# Patient Record
Sex: FEMALE | Race: BLACK OR AFRICAN AMERICAN | NOT HISPANIC OR LATINO | Employment: FULL TIME | URBAN - METROPOLITAN AREA
[De-identification: names, ages, dates, MRNs, and addresses within clinical notes are randomized per-mention and may not be internally consistent; named-entity substitution may affect disease eponyms.]

---

## 2017-05-23 ENCOUNTER — HOSPITAL ENCOUNTER (EMERGENCY)
Facility: CLINIC | Age: 27
Discharge: HOME | End: 2017-05-23
Attending: EMERGENCY MEDICINE | Admitting: EMERGENCY MEDICINE
Payer: COMMERCIAL

## 2017-05-23 VITALS
HEART RATE: 94 BPM | TEMPERATURE: 99.1 F | SYSTOLIC BLOOD PRESSURE: 121 MMHG | WEIGHT: 180.6 LBS | BODY MASS INDEX: 29.15 KG/M2 | RESPIRATION RATE: 18 BRPM | OXYGEN SATURATION: 99 % | DIASTOLIC BLOOD PRESSURE: 79 MMHG

## 2017-05-23 DIAGNOSIS — G89.29 CHRONIC LEFT-SIDED LOW BACK PAIN WITH LEFT-SIDED SCIATICA: ICD-10-CM

## 2017-05-23 DIAGNOSIS — M54.42 CHRONIC LEFT-SIDED LOW BACK PAIN WITH LEFT-SIDED SCIATICA: ICD-10-CM

## 2017-05-23 PROCEDURE — 99283 EMERGENCY DEPT VISIT LOW MDM: CPT

## 2017-05-23 PROCEDURE — 99283 EMERGENCY DEPT VISIT LOW MDM: CPT | Mod: Z6 | Performed by: EMERGENCY MEDICINE

## 2017-05-23 RX ORDER — NAPROXEN 500 MG/1
500 TABLET ORAL 2 TIMES DAILY WITH MEALS
Qty: 16 TABLET | Refills: 0 | Status: SHIPPED | OUTPATIENT
Start: 2017-05-23 | End: 2017-05-31

## 2017-05-23 RX ORDER — NAPROXEN 500 MG/1
500 TABLET ORAL ONCE
Status: DISCONTINUED | OUTPATIENT
Start: 2017-05-23 | End: 2017-05-23 | Stop reason: HOSPADM

## 2017-05-23 NOTE — ED AVS SNAPSHOT
[facility], [place], Emergency Department    [address]    [place] [address]    Phone:  [phone]    Fax:  [phone]                                       Bev Strong   MRN: [id number]    Department:  [facility], Emergency Department   Date of Visit:  5/23/2017           After Visit Summary Signature Page     I have received my discharge instructions, and my questions have been answered. I have discussed any challenges I see with this plan with the nurse or doctor.    ..........................................................................................................................................  Patient/Patient Representative Signature      ..........................................................................................................................................  Patient Representative Print Name and Relationship to Patient    ..................................................               ................................................  Date                                            Time    ..........................................................................................................................................  Reviewed by Signature/Title    ...................................................              ..............................................  Date                                                            Time

## 2017-05-23 NOTE — ED AVS SNAPSHOT
[facility], Emergency Department    [address]    [place] [address]    Phone:  [phone]    Fax:  [phone]                                       Bev Strong   MRN: [id number]    Department:  [facility], Emergency Department   Date of Visit:  5/23/2017           Patient Information     Date Of Birth          1990        Your diagnoses for this visit were:     Chronic left-sided low back pain with left-sided sciatica        You were seen by Carroll Page MD.        Discharge Instructions       Please make an appointment to follow up with Your Primary Care Provider as soon as possible if you have any concerns.      24 Hour Appointment Hotline       To make an appointment at any [facility] clinic, call [phone] ([phone]). If you don't have a family doctor or clinic, we will help you find one. [facility] clinics are conveniently located to serve the needs of you and your family.             Review of your medicines      START taking        Dose / Directions Last dose taken    naproxen 500 MG tablet   Commonly known as:  NAPROSYN   Dose:  500 mg   Quantity:  16 tablet        Take 1 tablet (500 mg) by mouth 2 times daily (with meals) for 8 days   Refills:  0          Our records show that you are taking the medicines listed below. If these are incorrect, please call your family doctor or clinic.        Dose / Directions Last dose taken    PRILOSEC PO   Dose:  20 mg        Take 20 mg by mouth   Refills:  0        TYLENOL PO   Dose:  500 mg        Take 500 mg by mouth   Refills:  0                Prescriptions were sent or printed at these locations (1 Prescription)                   Other Prescriptions                Printed at Department/Unit printer (1 of 1)         naproxen (NAPROSYN) 500 MG tablet                Orders Needing Specimen Collection     None      Pending Results     No orders found from 5/21/2017 to 5/24/2017.            Pending Culture Results     No  "orders found from 2017 to 2017.            Pending Results Instructions     If you had any lab results that were not finalized at the time of your Discharge, you can call the ED Lab Result RN at [phone]. You will be contacted by this team for any positive Lab results or changes in treatment. The nurses are available 7 days a week from 10A to 6:30P.  You can leave a message 24 hours per day and they will return your call.        Thank you for choosing [facility]       Thank you for choosing [facility] for your care. Our goal is always to provide you with excellent care. Hearing back from our patients is one way we can continue to improve our services. Please take a few minutes to complete the written survey that you may receive in the mail after you visit with us. Thank you!        [facility] Information     [facility] lets you send messages to your doctor, view your test results, renew your prescriptions, schedule appointments and more. To sign up, go to [email or web address] . Click on \"Log in\" on the left side of the screen, which will take you to the Welcome page. Then click on \"Sign up Now\" on the right side of the page.     You will be asked to enter the access code listed below, as well as some personal information. Please follow the directions to create your username and password.     Your access code is: [email or web address]  Expires: 2017  7:58 PM     Your access code will  in 90 days. If you need help or a new code, please call your [facility] clinic or [phone].        Care EveryWhere ID     This is your Care EveryWhere ID. This could be used by other organizations to access your [facility] medical records  [id number]        After Visit Summary       This is your record. Keep this with you and show to your community pharmacist(s) and doctor(s) at your next visit.                  "

## 2017-05-24 NOTE — DISCHARGE INSTRUCTIONS
Please make an appointment to follow up with Your Primary Care Provider as soon as possible if you have any concerns.

## 2017-05-24 NOTE — ED NOTES
Patient fell out of couch 1 week ago.  Come here today with increased back pain and tingling down left leg. Full weight bearing.

## 2017-05-24 NOTE — ED PROVIDER NOTES
History     Chief Complaint   Patient presents with     Back Pain     Last Thursday, fell asleep on couch and rolled off sofa onto the floor; has left back and thigh pain     Leg Pain     tingling down left buttock and down leg; shooting pain down leg also     HPI  Bev Strong is a 27 year old female who presents with complaint of left lower back pain and tingling with down her leg.  She admits to having similar symptoms in the past.  This particular incident was agitated when she rolled off her couch while she was sleeping last Thursday.  She admits to being able to urinate and defecate without issue.  She denies any other numbness.  She's been utilizing Tylenol at home with no significant improvement.  She has utilized steroids in the past for this complaint.    I have reviewed the Medications, Allergies, Past Medical and Surgical History, and Social History in the Epic system.    Review of Systems   All other systems reviewed and are negative.      Physical Exam   BP: 114/67  Pulse: 96  Heart Rate: 98  Temp: 98.6  F (37  C)  Resp: 16  Weight: 81.9 kg (180 lb 9.6 oz)  SpO2: 100 %  Physical Exam   Constitutional: She is oriented to person, place, and time. She appears well-developed and well-nourished. No distress.   HENT:   Head: Normocephalic and atraumatic.   Eyes: No scleral icterus.   Neck: Normal range of motion. Neck supple.   Cardiovascular: Normal rate.    Pulmonary/Chest: Effort normal.   Musculoskeletal:   Patient staying in bed in no obvious distress.  She is able to ambulate without issues.    Does have some minimal paraspinal tenderness on the left.   Neurological: She is alert and oriented to person, place, and time.   Skin: Skin is warm and dry. No rash noted. She is not diaphoretic. No erythema. No pallor.       ED Course     ED Course     Procedures             Critical Care time:  none               Labs Ordered and Resulted from Time of ED Arrival Up to the Time of Departure from the ED  - No data to display         Assessments & Plan (with Medical Decision Making)   This is a 27-year-old female patient presenting to the emergency room with complaints of left lower back pain with sciatica.  She is in no obvious distress no concerns for cauda equina.  Suspended issue for her in the past and she was recently seen in March for similar symptoms.  She has taken prednisone and topical creams for this in the past with no significant improvement.  She is ambulatory without issue and has no obvious signs of cauda equina.  She did receive a prescription for naproxen and can follow up with her primary care doctor for any further concerns.    I have reviewed the nursing notes.    I have reviewed the findings, diagnosis, plan and need for follow up with the patient.    Discharge Medication List as of 5/23/2017  7:58 PM      START taking these medications    Details   naproxen (NAPROSYN) 500 MG tablet Take 1 tablet (500 mg) by mouth 2 times daily (with meals) for 8 days, Disp-16 tablet, R-0, Local Print             Final diagnoses:   Chronic left-sided low back pain with left-sided sciatica       5/23/2017   [facility], [place], EMERGENCY DEPARTMENT     Carroll Page MD  05/23/17 4888

## 2017-06-13 ENCOUNTER — HOSPITAL ENCOUNTER (EMERGENCY)
Facility: CLINIC | Age: 27
Discharge: HOME | End: 2017-06-13
Attending: EMERGENCY MEDICINE

## 2017-06-13 VITALS
DIASTOLIC BLOOD PRESSURE: 78 MMHG | HEART RATE: 92 BPM | WEIGHT: 187.13 LBS | TEMPERATURE: 99.3 F | RESPIRATION RATE: 16 BRPM | SYSTOLIC BLOOD PRESSURE: 115 MMHG | OXYGEN SATURATION: 96 %

## 2017-06-13 DIAGNOSIS — M54.42 ACUTE LEFT-SIDED LOW BACK PAIN WITH LEFT-SIDED SCIATICA: ICD-10-CM

## 2017-06-13 RX ORDER — KETOROLAC TROMETHAMINE 30 MG/ML
30 INJECTION, SOLUTION INTRAMUSCULAR; INTRAVENOUS ONCE
Status: DISCONTINUED | OUTPATIENT
Start: 2017-06-13 | End: 2017-06-14 | Stop reason: HOSPADM

## 2017-06-13 RX ORDER — CYCLOBENZAPRINE HCL 5 MG
5 TABLET ORAL 3 TIMES DAILY PRN
Qty: 20 TABLET | Refills: 0 | Status: SHIPPED | OUTPATIENT
Start: 2017-06-13 | End: 2020-12-23

## 2017-06-13 RX ORDER — NAPROXEN 500 MG/1
500 TABLET ORAL 2 TIMES DAILY WITH MEALS
Qty: 16 TABLET | Refills: 0 | Status: SHIPPED | OUTPATIENT
Start: 2017-06-13 | End: 2017-06-21

## 2017-06-13 RX ORDER — CYCLOBENZAPRINE HCL 5 MG
5 TABLET ORAL 2 TIMES DAILY PRN
Qty: 20 TABLET | Refills: 0 | Status: SHIPPED | OUTPATIENT
Start: 2017-06-13 | End: 2020-12-23

## 2017-06-13 ASSESSMENT — ENCOUNTER SYMPTOMS
NAUSEA: 0
ABDOMINAL PAIN: 0
DIFFICULTY URINATING: 0
SHORTNESS OF BREATH: 0
NUMBNESS: 0
CHILLS: 0
FEVER: 0
COLOR CHANGE: 0
NECK STIFFNESS: 0
LIGHT-HEADEDNESS: 0
NECK PAIN: 0
ADENOPATHY: 0
WEAKNESS: 0
POLYDIPSIA: 0
VOMITING: 0
BRUISES/BLEEDS EASILY: 0
AGITATION: 0
BACK PAIN: 1
ARTHRALGIAS: 0

## 2017-06-14 NOTE — DISCHARGE INSTRUCTIONS
Please make an appointment to [facility] with [facility] (phone: [phone] in 2 days in order to establish primary care and for further evaluation.
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
## 2017-09-21 ENCOUNTER — HOSPITAL ENCOUNTER (EMERGENCY)
Facility: CLINIC | Age: 27
Discharge: HOME | End: 2017-09-22
Attending: EMERGENCY MEDICINE | Admitting: EMERGENCY MEDICINE
Payer: COMMERCIAL

## 2017-09-21 VITALS
OXYGEN SATURATION: 99 % | SYSTOLIC BLOOD PRESSURE: 112 MMHG | BODY MASS INDEX: 25.73 KG/M2 | TEMPERATURE: 98.1 F | DIASTOLIC BLOOD PRESSURE: 63 MMHG | WEIGHT: 159.39 LBS

## 2017-09-21 DIAGNOSIS — R10.9 FLANK PAIN: ICD-10-CM

## 2017-09-21 PROCEDURE — 76775 US EXAM ABDO BACK WALL LIM: CPT

## 2017-09-21 PROCEDURE — 99284 EMERGENCY DEPT VISIT MOD MDM: CPT

## 2017-09-21 NOTE — ED AVS SNAPSHOT
Emergency Department    [address] [facility] [address]    Phone:  [phone]    Fax:  [phone]                                       Bev Strong   MRN: [id number]    Department:   Emergency Department   Date of Visit:  9/21/2017           Patient Information     Date Of Birth          1990        Your diagnoses for this visit were:     Flank pain        You were seen by Venu Marcelo MD.      Follow-up Information     Follow up with Your Primary Care Doctor In 3 days.        Follow up with  Emergency Department.    Specialty:  EMERGENCY MEDICINE    Why:  As needed, If symptoms worsen    Contact information:    [address] [facility] [address] [phone]        Discharge Instructions         Flank Pain, Uncertain Cause  The flank is the area between your upper abdomen and your back. Pain there is often caused by a problem with your kidneys. It might be a kidney infection or a kidney stone. Other causes of flank pain include spinal arthritis, a pinched nerve from a back injury, or a back muscle strain or spasm.  The cause of your flank pain is not certain. You may need other tests.  Home care  Follow these tips when caring for yourself at home:    You may use acetaminophen or ibuprofen to control pain, unless your health care provider prescribed another medicine. If you have chronic liver or kidney disease, talk with your provider before taking these medicines. Also talk with your provider first if you ve ever had a stomach ulcer or GI bleeding.    If the pain is coming from your muscles, you may get relief with ice or heat. During the first 2 days after the injury, put an ice pack on the painful area for 20 minutes every 2 to 4 hours. This will reduce swelling and pain. A hot shower, hot bath, or heating pad works well for a muscle spasm. You can start with ice, then switch to heat after 2 days. You might find that alternating ice and heat works  well. Use the method that feels the best to you.  Follow-up care  Follow up with your healthcare provider if your symptoms don t get better over the next few days.  When to seek medical advice  Call your healthcare provider right away if any of these happen:    Repeated vomiting    Fever of 100.4 F (38 C) or higher, or as directed by your health care provider    Flank pain that gets worse    Pain that spreads to the front of your belly (abdomen)    Dizziness, weakness, or fainting    Blood in your urine    Burning feeling when you urinate or the need to urinate often    Pain in one of your legs that gets worse    Numbness or weakness in a leg  Date Last Reviewed: 10/1/2016    0725-5717 [employer]. [address] [place] [address], [place] [address]. All rights reserved. This information is not intended as a substitute for professional medical care. Always follow your healthcare professional's instructions.        Chronic Pain  Pain serves an important role. It lets you know something is wrong that needs your attention. When the body heals, pain normally goes away.  When pain lasts longer than six months, it is called  chronic  pain. This is pain that is present even after the body has healed. Chronic pain can cause mood problems and get in the way of your relationships and your daily life.  A number of conditions can cause chronic pain. Some of the more common include:    Previous surgery    An old injury    Infection    Diseases such as diabetes    Nerve damage    Back injury    Arthritis    Migraine or other headaches    Fibromyalgia    Cancer  Depression and stress can make chronic pain symptoms worse. In some cases, a cause for the pain cannot be found.   Treatment  Treatment can greatly reduce pain. In many cases, pain can become less severe, occur less often, and interfere less with your daily life. Chronic pain is often treated with a combination of medicines, therapies, and lifestyle changes. You will  work closely with your healthcare provider to find a treatment plan that works best for you.    Ask your healthcare provider for a referral to a pain management specialty center. These can provide the most recent and proven pain management strategies, along with emotional support and comprehensive services.    Several different types of medicines may be prescribed for chronic pain. Work with your healthcare provider to develop a medicine plan that helps manage your pain.    Physical therapy can be very effective in helping reduce certain types of chronic pain.    Occupational therapy teaches you how to do routine tasks of daily living in ways that lessen your discomfort.    Psychological therapy can help you cope better with stress and pain.    Other therapies such as meditation, yoga, biofeedback, massage, and acupuncture can also help manage chronic pain.    Changing certain habits can help reduce chronic pain. They include:    Eating healthy    Developing an exercise routine    Getting enough sleep at night    Stopping smoking and limiting alcohol use    Losing excess weight  Follow-up care  Follow up with your healthcare provider as advised. Let your healthcare provider know if your current treatment plan is working or if changes are needed.  Resources  For more information, contact:    American [place] for Headache Society www.achenet.org    American Chronic Pain Association www.theacpa.org [phone]  Date Last Reviewed: 7/26/2015 2000-2017 [employer]. [address] [place] [address], [place] [address]. All rights reserved. This information is not intended as a substitute for professional medical care. Always follow your healthcare professional's instructions.          24 Hour Appointment Hotline       To make an appointment at any [facility], call [phone] ([phone]). If you don't have a family doctor or clinic, we will help you find one. [facility] are conveniently located  to serve the needs of you and your family.             Review of your medicines      Our records show that you are taking the medicines listed below. If these are incorrect, please call your family doctor or clinic.        Dose / Directions Last dose taken    * cyclobenzaprine 5 MG tablet   Commonly known as:  FLEXERIL   Dose:  5 mg   Quantity:  20 tablet        Take 1 tablet (5 mg) by mouth 2 times daily as needed for muscle spasms   Refills:  0        * cyclobenzaprine 5 MG tablet   Commonly known as:  FLEXERIL   Dose:  5 mg   Quantity:  20 tablet        Take 1 tablet (5 mg) by mouth 3 times daily as needed for muscle spasms   Refills:  0        PRILOSEC PO   Dose:  20 mg        Take 20 mg by mouth   Refills:  0        TYLENOL PO   Dose:  500 mg        Take 500 mg by mouth   Refills:  0        * Notice:  This list has 2 medication(s) that are the same as other medications prescribed for you. Read the directions carefully, and ask your doctor or other care provider to review them with you.            Procedures and tests performed during your visit     HCG qualitative urine    POC US RETROPERITONEAL LIMITED    UA with Microscopic      Orders Needing Specimen Collection     None      Pending Results     Date and Time Order Name Status Description    9/22/2017 0035 POC US RETROPERITONEAL LIMITED In process             Pending Culture Results     No orders found for last 3 day(s).            Pending Results Instructions     If you had any lab results that were not finalized at the time of your Discharge, you can call the ED Lab Result RN at [phone]. You will be contacted by this team for any positive Lab results or changes in treatment. The nurses are available 7 days a week from 10A to 6:30P.  You can leave a message 24 hours per day and they will return your call.        Test Results From Your Hospital Stay        9/22/2017 12:23 AM      Component Results     Component Value Ref Range & Units Status    Color  Urine Yellow  Final    Appearance Urine Clear  Final    Glucose Urine Negative NEG^Negative mg/dL Final    Bilirubin Urine Negative NEG^Negative Final    Ketones Urine Negative NEG^Negative mg/dL Final    Specific Gravity Urine 1.012 1.003 - 1.035 Final    Blood Urine Trace (A) NEG^Negative Final    pH Urine 6.5 5.0 - 7.0 pH Final    Protein Albumin Urine Negative NEG^Negative mg/dL Final    Urobilinogen mg/dL 2.0 0.0 - 2.0 mg/dL Final    Nitrite Urine Negative NEG^Negative Final    Leukocyte Esterase Urine Negative NEG^Negative Final    Source Midstream Urine  Final    WBC Urine <1 0 - 2 /HPF Final    RBC Urine 1 0 - 2 /HPF Final    Bacteria Urine Few (A) NEG^Negative /HPF Final    Squamous Epithelial /HPF Urine 1 0 - 1 /HPF Final    Mucous Urine Present (A) NEG^Negative /LPF Final         9/22/2017 12:25 AM      Component Results     Component Value Ref Range & Units Status    HCG Qual Urine Negative NEG^Negative Final    This test is for screening purposes.  Results should be interpreted along with   the clinical picture.  Confirmation testing is available if warranted by   ordering QSP442, HCG Quantitative Pregnancy.           9/22/2017 12:35 AM      Result not yet available     Exam Begun                Clinical Quality Measure: Blood Pressure Screening     Your blood pressure was checked while you were in the emergency department today. The last reading we obtained was  BP: 112/63 . Please read the guidelines below about what these numbers mean and what you should do about them.  If your systolic blood pressure (the top number) is less than 120 and your diastolic blood pressure (the bottom number) is less than 80, then your blood pressure is normal. There is nothing more that you need to do about it.  If your systolic blood pressure (the top number) is 120-139 or your diastolic blood pressure (the bottom number) is 80-89, your blood pressure may be higher than it should be. You should have your blood pressure  "rechecked within a year by a primary care provider.  If your systolic blood pressure (the top number) is 140 or greater or your diastolic blood pressure (the bottom number) is 90 or greater, you may have high blood pressure. High blood pressure is treatable, but if left untreated over time it can put you at risk for heart attack, stroke, or kidney failure. You should have your blood pressure rechecked by a primary care provider within the next 4 weeks.  If your provider in the emergency department today gave you specific instructions to follow-up with your doctor or provider even sooner than that, you should follow that instruction and not wait for up to 4 weeks for your follow-up visit.        Thank you for choosing [facility]       Thank you for choosing [facility] for your care. Our goal is always to provide you with excellent care. Hearing back from our patients is one way we can continue to improve our services. Please take a few minutes to complete the written survey that you may receive in the mail after you visit with us. Thank you!        [employer] Interactive Information     [employer] lets you send messages to your doctor, view your test results, renew your prescriptions, schedule appointments and more. To sign up, go to [email or web address] . Click on \"Log in\" on the left side of the screen, which will take you to the Welcome page. Then click on \"Sign up Now\" on the right side of the page.     You will be asked to enter the access code listed below, as well as some personal information. Please follow the directions to create your username and password.     Your access code is: [email or web address]  Expires: 2017  1:19 AM     Your access code will  in 90 days. If you need help or a new code, please call your [facility] clinic or [phone].        Care EveryWhere ID     This is your Care EveryWhere ID. This could be used by other organizations to access your [facility] medical records  [id number]        Equal Access to " Services     [facility]: Karlee Caraballo, wamarcela luhannahadaha, qamatthias young. So [facility] [phone].    ATENCIÓN: Si habla español, tiene a walker disposición servicios gratuitos de asistencia lingüística. Llame al [phone].    We comply with applicable federal civil rights laws and [place] laws. We do not discriminate on the basis of race, color, national origin, age, disability sex, sexual orientation or gender identity.            After Visit Summary       This is your record. Keep this with you and show to your community pharmacist(s) and doctor(s) at your next visit.

## 2017-09-21 NOTE — ED AVS SNAPSHOT
Emergency Department    [address] [place] [address]    Phone:  [phone]    Fax:  [phone]                                       Bev Strong   MRN: [id number]    Department:   Emergency Department   Date of Visit:  9/21/2017           After Visit Summary Signature Page     I have received my discharge instructions, and my questions have been answered. I have discussed any challenges I see with this plan with the nurse or doctor.    ..........................................................................................................................................  Patient/Patient Representative Signature      ..........................................................................................................................................  Patient Representative Print Name and Relationship to Patient    ..................................................               ................................................  Date                                            Time    ..........................................................................................................................................  Reviewed by Signature/Title    ...................................................              ..............................................  Date                                                            Time

## 2017-09-22 LAB
ALBUMIN UR-MCNC: NEGATIVE MG/DL
APPEARANCE UR: CLEAR
BACTERIA #/AREA URNS HPF: ABNORMAL /HPF
BILIRUB UR QL STRIP: NEGATIVE
COLOR UR AUTO: YELLOW
GLUCOSE UR STRIP-MCNC: NEGATIVE MG/DL
HCG UR QL: NEGATIVE
HGB UR QL STRIP: ABNORMAL
KETONES UR STRIP-MCNC: NEGATIVE MG/DL
LEUKOCYTE ESTERASE UR QL STRIP: NEGATIVE
MUCOUS THREADS #/AREA URNS LPF: PRESENT /LPF
NITRATE UR QL: NEGATIVE
PH UR STRIP: 6.5 PH (ref 5–7)
RBC #/AREA URNS AUTO: 1 /HPF (ref 0–2)
SOURCE: ABNORMAL
SP GR UR STRIP: 1.01 (ref 1–1.03)
SQUAMOUS #/AREA URNS AUTO: 1 /HPF (ref 0–1)
UROBILINOGEN UR STRIP-MCNC: 2 MG/DL (ref 0–2)
WBC #/AREA URNS AUTO: <1 /HPF (ref 0–2)

## 2017-09-22 PROCEDURE — 81025 URINE PREGNANCY TEST: CPT | Performed by: EMERGENCY MEDICINE

## 2017-09-22 PROCEDURE — 81001 URINALYSIS AUTO W/SCOPE: CPT | Performed by: EMERGENCY MEDICINE

## 2017-09-22 ASSESSMENT — ENCOUNTER SYMPTOMS
DYSURIA: 1
HEMATURIA: 0
FLANK PAIN: 1
FEVER: 0
NAUSEA: 1
VOMITING: 1

## 2017-09-22 NOTE — DISCHARGE INSTRUCTIONS
Flank Pain, Uncertain Cause  The flank is the area between your upper abdomen and your back. Pain there is often caused by a problem with your kidneys. It might be a kidney infection or a kidney stone. Other causes of flank pain include spinal arthritis, a pinched nerve from a back injury, or a back muscle strain or spasm.  The cause of your flank pain is not certain. You may need other tests.  Home care  Follow these tips when caring for yourself at home:    You may use acetaminophen or ibuprofen to control pain, unless your health care provider prescribed another medicine. If you have chronic liver or kidney disease, talk with your provider before taking these medicines. Also talk with your provider first if you ve ever had a stomach ulcer or GI bleeding.    If the pain is coming from your muscles, you may get relief with ice or heat. During the first 2 days after the injury, put an ice pack on the painful area for 20 minutes every 2 to 4 hours. This will reduce swelling and pain. A hot shower, hot bath, or heating pad works well for a muscle spasm. You can start with ice, then switch to heat after 2 days. You might find that alternating ice and heat works well. Use the method that feels the best to you.  Follow-up care  Follow up with your healthcare provider if your symptoms don t get better over the next few days.  When to seek medical advice  Call your healthcare provider right away if any of these happen:    Repeated vomiting    Fever of 100.4 F (38 C) or higher, or as directed by your health care provider    Flank pain that gets worse    Pain that spreads to the front of your belly (abdomen)    Dizziness, weakness, or fainting    Blood in your urine    Burning feeling when you urinate or the need to urinate often    Pain in one of your legs that gets worse    Numbness or weakness in a leg  Date Last Reviewed: 10/1/2016    6358-2071 [employer]. [address] [place] [address], [place] [address]. All  rights reserved. This information is not intended as a substitute for professional medical care. Always follow your healthcare professional's instructions.        Chronic Pain  Pain serves an important role. It lets you know something is wrong that needs your attention. When the body heals, pain normally goes away.  When pain lasts longer than six months, it is called  chronic  pain. This is pain that is present even after the body has healed. Chronic pain can cause mood problems and get in the way of your relationships and your daily life.  A number of conditions can cause chronic pain. Some of the more common include:    Previous surgery    An old injury    Infection    Diseases such as diabetes    Nerve damage    Back injury    Arthritis    Migraine or other headaches    Fibromyalgia    Cancer  Depression and stress can make chronic pain symptoms worse. In some cases, a cause for the pain cannot be found.   Treatment  Treatment can greatly reduce pain. In many cases, pain can become less severe, occur less often, and interfere less with your daily life. Chronic pain is often treated with a combination of medicines, therapies, and lifestyle changes. You will work closely with your healthcare provider to find a treatment plan that works best for you.    Ask your healthcare provider for a referral to a pain management specialty center. These can provide the most recent and proven pain management strategies, along with emotional support and comprehensive services.    Several different types of medicines may be prescribed for chronic pain. Work with your healthcare provider to develop a medicine plan that helps manage your pain.    Physical therapy can be very effective in helping reduce certain types of chronic pain.    Occupational therapy teaches you how to do routine tasks of daily living in ways that lessen your discomfort.    Psychological therapy can help you cope better with stress and pain.    Other therapies  such as meditation, yoga, biofeedback, massage, and acupuncture can also help manage chronic pain.    Changing certain habits can help reduce chronic pain. They include:    Eating healthy    Developing an exercise routine    Getting enough sleep at night    Stopping smoking and limiting alcohol use    Losing excess weight  Follow-up care  Follow up with your healthcare provider as advised. Let your healthcare provider know if your current treatment plan is working or if changes are needed.  Resources  For more information, contact:    American [place] for Headache Society www.achenet.org    American Chronic Pain Association www.theacpa.org [phone]  Date Last Reviewed: 7/26/2015 2000-2017 [employer]. [address] [place] [address], [place] [address]. All rights reserved. This information is not intended as a substitute for professional medical care. Always follow your healthcare professional's instructions.

## 2017-09-22 NOTE — ED PROVIDER NOTES
History     Chief Complaint:  Flank pain     HPI   Bev Strong is a 27 year old female who presents for evaluation of left flank pain. The patient is somewhat of a poor historian, but states that the pain has been going on for at least two months intermittently. She states that it has been constant for 3 weeks and got worse last night. The patient reports that after she got off work last night her pain intensified and reports that she had one episode of vomiting. The patient has not been taking anything for the pain. She does not report any aggravating factors such as movement or eating. The patient has had some minor burning with urination as well. She denies fever.     Allergies:  No known drug allergies.      Medications:    Flexeril   Omeprazole     Past Medical History:    History reviewed.  No significant past medical history.      Past Surgical History:    History reviewed. No pertinent past surgical history.     Family History:    History reviewed. No pertinent family history.     Social History:  Presents to the ED alone  Tobacco Use: Never  Alcohol Use: No   PCP: [facility]     Review of Systems   Constitutional: Negative for fever.   Gastrointestinal: Positive for nausea and vomiting.   Genitourinary: Positive for dysuria and flank pain. Negative for hematuria and urgency.   All other systems reviewed and are negative.    Physical Exam   First Vitals:  BP: 112/63  Heart Rate: 107  Temp: 98.1  F (36.7  C)  Weight: 72.3 kg (159 lb 6.3 oz)  SpO2: 99 %      Physical Exam  General: Appears well-developed and well-nourished.   Head: No signs of trauma.   CV: Normal rate and regular rhythm.    Resp: Effort normal and breath sounds normal. No respiratory distress.   GI: Soft. There is no tenderness or guarding.  Normal bowel sounds.  No CVA tenderness.  MSK: Normal range of motion. no edema. No Calf tenderness.  Neuro: The patient is alert and oriented.  Speech normal.  GCS 15  Skin: Skin is warm and dry. No  rash noted.   Psych: normal mood and affect. behavior is normal.       Emergency Department Course     Imaging:  POC US Retroperitoneal Limited  The evaluation of the kidneys was normal without evidence of hydronephrosis or cysts.    Radiographic findings were communicated with the patient who voiced understanding of the findings.    Laboratory:  Urine:  UA: Clear yellow urine, Trace blood, Few bacteria, Mucous present, otherwise WNL  HCG Qualitative: Negative.     Emergency Department Course:  Nursing notes and vitals reviewed.  I performed an exam of the patient as documented above.   Urine sample was obtained and sent for laboratory analysis, findings above.   I performed a bedside ultrasound as documented above.   Findings and plan explained to the patient. Patient discharged home with instructions regarding supportive care, medications, and reasons to return. The importance of close follow-up was reviewed.     Impression & Plan      Medical Decision Making:  Bev Strong is a 27-year-old female who presents with left flank pain.  In speaking with her, it seems as she's actually had this pain for many months.  She had been seen in [facility] and by her primary care doctor.  On my evaluation, her exam was overall benign.  UA did not show any signs of urinary tract infection and she is not pregnant.  I did perform a bedside ultrasound which shows no signs of hydronephrosis, renal cyst, or other significant pathology.  Given she's had these symptoms for many months, I feel that she is appropriate for discharge from the patient workup.  She recommend supportive care with Tylenol and ibuprofen and push plenty fluids.  Return here for any new or worsening symptoms, or further concerns.    Diagnosis:    ICD-10-CM    1. Flank pain R10.9      Disposition:  discharged to home    Scribe disclosure:  Matthew VILLANUEVA, am serving as a scribe on 9/22/2017 at 12:29 AM to personally document services performed by  Dr. Marcelo based on my observations and the provider's statements to me.     EMERGENCY DEPARTMENT       Venu Marcelo MD  09/22/17 2700

## 2019-01-17 ENCOUNTER — APPOINTMENT (OUTPATIENT)
Dept: GENERAL RADIOLOGY | Facility: CLINIC | Age: 29
End: 2019-01-17
Payer: COMMERCIAL

## 2019-01-17 ENCOUNTER — HOSPITAL ENCOUNTER (EMERGENCY)
Facility: CLINIC | Age: 29
Discharge: HOME | End: 2019-01-17
Attending: EMERGENCY MEDICINE | Admitting: EMERGENCY MEDICINE
Payer: COMMERCIAL

## 2019-01-17 VITALS
HEART RATE: 95 BPM | TEMPERATURE: 98.1 F | OXYGEN SATURATION: 93 % | DIASTOLIC BLOOD PRESSURE: 74 MMHG | SYSTOLIC BLOOD PRESSURE: 113 MMHG | HEIGHT: 66 IN | BODY MASS INDEX: 28.12 KG/M2 | RESPIRATION RATE: 20 BRPM | WEIGHT: 175 LBS

## 2019-01-17 DIAGNOSIS — M54.50 ACUTE BILATERAL LOW BACK PAIN WITHOUT SCIATICA: ICD-10-CM

## 2019-01-17 DIAGNOSIS — S30.1XXA CONTUSION OF ABDOMINAL WALL, INITIAL ENCOUNTER: ICD-10-CM
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
## 2019-01-17 DIAGNOSIS — J93.9 PNEUMOTHORAX ON LEFT: ICD-10-CM

## 2019-01-17 LAB — RADIOLOGIST FLAGS: ABNORMAL

## 2019-01-17 PROCEDURE — 72040 X-RAY EXAM NECK SPINE 2-3 VW: CPT

## 2019-01-17 PROCEDURE — 99284 EMERGENCY DEPT VISIT MOD MDM: CPT | Mod: 25

## 2019-01-17 PROCEDURE — 72100 X-RAY EXAM L-S SPINE 2/3 VWS: CPT

## 2019-01-17 PROCEDURE — 71046 X-RAY EXAM CHEST 2 VIEWS: CPT

## 2019-01-17 PROCEDURE — 25000132 ZZH RX MED GY IP 250 OP 250 PS 637: Performed by: EMERGENCY MEDICINE

## 2019-01-17 RX ORDER — METHOCARBAMOL 500 MG/1
1000 TABLET, FILM COATED ORAL 3 TIMES DAILY PRN
Qty: 30 TABLET | Refills: 0 | Status: SHIPPED | OUTPATIENT
Start: 2019-01-17 | End: 2019-01-27

## 2019-01-17 RX ORDER — LIDOCAINE 4 G/G
1 PATCH TOPICAL
Status: DISCONTINUED | OUTPATIENT
Start: 2019-01-17 | End: 2019-01-18 | Stop reason: HOSPADM

## 2019-01-17 RX ORDER — IBUPROFEN 600 MG/1
600 TABLET, FILM COATED ORAL ONCE
Status: COMPLETED | OUTPATIENT
Start: 2019-01-17 | End: 2019-01-17

## 2019-01-17 RX ORDER — HYDROCODONE BITARTRATE AND ACETAMINOPHEN 5; 325 MG/1; MG/1
1 TABLET ORAL EVERY 6 HOURS PRN
Qty: 10 TABLET | Refills: 0 | Status: SHIPPED | OUTPATIENT
Start: 2019-01-17 | End: 2019-01-20

## 2019-01-17 RX ADMIN — LIDOCAINE 1 PATCH: 560 PATCH PERCUTANEOUS; TOPICAL; TRANSDERMAL at 23:22

## 2019-01-17 RX ADMIN — IBUPROFEN 600 MG: 600 TABLET, FILM COATED ORAL at 21:46

## 2019-01-17 ASSESSMENT — MIFFLIN-ST. JEOR: SCORE: 1525.54

## 2019-01-17 NOTE — ED AVS SNAPSHOT
Emergency Department  [address] [place] [id number]  Phone:  [phone]  Fax:  [phone]                                    Bev Strong   MRN: [id number]    Department:   Emergency Department   Date of Visit:  1/17/2019           After Visit Summary Signature Page    I have received my discharge instructions, and my questions have been answered. I have discussed any challenges I see with this plan with the nurse or doctor.    ..........................................................................................................................................  Patient/Patient Representative Signature      ..........................................................................................................................................  Patient Representative Print Name and Relationship to Patient    ..................................................               ................................................  Date                                   Time    ..........................................................................................................................................  Reviewed by Signature/Title    ...................................................              ..............................................  Date                                               Time          22EPIC Rev 08/18

## 2019-01-18 ASSESSMENT — ENCOUNTER SYMPTOMS
NEUROLOGICAL NEGATIVE: 1
NECK PAIN: 1
CONSTITUTIONAL NEGATIVE: 1
NAUSEA: 0
DIARRHEA: 0
CONSTIPATION: 0
VOMITING: 0
ABDOMINAL DISTENTION: 1
BACK PAIN: 1
EYES NEGATIVE: 1
SHORTNESS OF BREATH: 0
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
## 2019-10-30 ENCOUNTER — TELEPHONE (OUTPATIENT)
Dept: UROLOGY | Facility: CLINIC | Age: 29
End: 2019-10-30

## 2019-10-30 NOTE — TELEPHONE ENCOUNTER
Dr Duran will be more than happy to see this patient.  Please contact her and schedule an appointment for a new patient visit, discuss circumcision revision.    Madisyn Gomez MBA, BSN, RN  Urology Patient Care Supervisor
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
## 2020-12-23 ENCOUNTER — OFFICE VISIT (OUTPATIENT)
Dept: MIDWIFE SERVICES | Facility: CLINIC | Age: 30
End: 2020-12-23
Payer: MEDICAID

## 2020-12-23 DIAGNOSIS — N90.813 FEMALE GENITAL MUTILATION WITH INFIBULATION: ICD-10-CM

## 2020-12-23 DIAGNOSIS — N92.6 IRREGULAR MENSES: ICD-10-CM

## 2020-12-23 DIAGNOSIS — N93.9 ABNORMAL UTERINE BLEEDING (AUB): Primary | ICD-10-CM

## 2020-12-23 DIAGNOSIS — N91.2 AMENORRHEA: ICD-10-CM

## 2020-12-23 LAB
ALBUMIN UR-MCNC: NEGATIVE MG/DL
APPEARANCE UR: CLEAR
BACTERIA #/AREA URNS HPF: ABNORMAL /HPF
BASOPHILS # BLD AUTO: 0 10E9/L (ref 0–0.2)
BASOPHILS NFR BLD AUTO: 0.3 %
BILIRUB UR QL STRIP: NEGATIVE
COLOR UR AUTO: YELLOW
DIFFERENTIAL METHOD BLD: NORMAL
EOSINOPHIL # BLD AUTO: 0.1 10E9/L (ref 0–0.7)
EOSINOPHIL NFR BLD AUTO: 1.1 %
ERYTHROCYTE [DISTWIDTH] IN BLOOD BY AUTOMATED COUNT: 13.5 % (ref 10–15)
GLUCOSE UR STRIP-MCNC: NEGATIVE MG/DL
HCG UR QL: NEGATIVE
HCT VFR BLD AUTO: 43.2 % (ref 35–47)
HGB BLD-MCNC: 14.2 G/DL (ref 11.7–15.7)
HGB UR QL STRIP: NEGATIVE
KETONES UR STRIP-MCNC: NEGATIVE MG/DL
LEUKOCYTE ESTERASE UR QL STRIP: NEGATIVE
LYMPHOCYTES # BLD AUTO: 2.8 10E9/L (ref 0.8–5.3)
LYMPHOCYTES NFR BLD AUTO: 27.8 %
MCH RBC QN AUTO: 29.3 PG (ref 26.5–33)
MCHC RBC AUTO-ENTMCNC: 32.9 G/DL (ref 31.5–36.5)
MCV RBC AUTO: 89 FL (ref 78–100)
MONOCYTES # BLD AUTO: 1.1 10E9/L (ref 0–1.3)
MONOCYTES NFR BLD AUTO: 11.2 %
NEUTROPHILS # BLD AUTO: 6 10E9/L (ref 1.6–8.3)
NEUTROPHILS NFR BLD AUTO: 59.6 %
NITRATE UR QL: NEGATIVE
NON-SQ EPI CELLS #/AREA URNS LPF: ABNORMAL /LPF
PH UR STRIP: 8 PH (ref 5–7)
PLATELET # BLD AUTO: 324 10E9/L (ref 150–450)
RBC # BLD AUTO: 4.85 10E12/L (ref 3.8–5.2)
RBC #/AREA URNS AUTO: ABNORMAL /HPF
SOURCE: ABNORMAL
SP GR UR STRIP: 1.01 (ref 1–1.03)
UROBILINOGEN UR STRIP-ACNC: 0.2 EU/DL (ref 0.2–1)
WBC # BLD AUTO: 10.1 10E9/L (ref 4–11)
WBC #/AREA URNS AUTO: ABNORMAL /HPF

## 2020-12-23 PROCEDURE — 84443 ASSAY THYROID STIM HORMONE: CPT | Performed by: NURSE PRACTITIONER

## 2020-12-23 PROCEDURE — 85025 COMPLETE CBC W/AUTO DIFF WBC: CPT | Performed by: NURSE PRACTITIONER

## 2020-12-23 PROCEDURE — 81001 URINALYSIS AUTO W/SCOPE: CPT | Performed by: NURSE PRACTITIONER

## 2020-12-23 PROCEDURE — 36415 COLL VENOUS BLD VENIPUNCTURE: CPT | Performed by: NURSE PRACTITIONER

## 2020-12-23 PROCEDURE — 84702 CHORIONIC GONADOTROPIN TEST: CPT | Performed by: NURSE PRACTITIONER

## 2020-12-23 PROCEDURE — 81025 URINE PREGNANCY TEST: CPT | Performed by: NURSE PRACTITIONER

## 2020-12-23 PROCEDURE — 99203 OFFICE O/P NEW LOW 30 MIN: CPT | Performed by: NURSE PRACTITIONER

## 2020-12-23 RX ORDER — VITAMIN A, VITAMIN C, VITAMIN D-3, VITAMIN E, VITAMIN B-1, VITAMIN B-2, NIACIN, VITAMIN B-6, CALCIUM, IRON, ZINC, COPPER 4000; 120; 400; 22; 1.84; 3; 20; 10; 1; 12; 200; 27; 25; 2 [IU]/1; MG/1; [IU]/1; MG/1; MG/1; MG/1; MG/1; MG/1; MG/1; UG/1; MG/1; MG/1; MG/1; MG/1
1 TABLET ORAL
Qty: 30 TABLET | Refills: 3 | Status: SHIPPED | OUTPATIENT
Start: 2020-12-23 | End: 2021-04-27

## 2020-12-23 SDOH — HEALTH STABILITY: MENTAL HEALTH: HOW OFTEN DO YOU HAVE 6 OR MORE DRINKS ON ONE OCCASION?: NEVER

## 2020-12-23 SDOH — HEALTH STABILITY: MENTAL HEALTH: HOW OFTEN DO YOU HAVE A DRINK CONTAINING ALCOHOL?: NEVER

## 2020-12-23 SDOH — HEALTH STABILITY: MENTAL HEALTH: HOW MANY STANDARD DRINKS CONTAINING ALCOHOL DO YOU HAVE ON A TYPICAL DAY?: NOT ASKED

## 2020-12-23 ASSESSMENT — PATIENT HEALTH QUESTIONNAIRE - PHQ9
5. POOR APPETITE OR OVEREATING: NOT AT ALL
SUM OF ALL RESPONSES TO PHQ QUESTIONS 1-9: 0

## 2020-12-23 ASSESSMENT — ANXIETY QUESTIONNAIRES
2. NOT BEING ABLE TO STOP OR CONTROL WORRYING: NOT AT ALL
6. BECOMING EASILY ANNOYED OR IRRITABLE: NOT AT ALL
GAD7 TOTAL SCORE: 0
3. WORRYING TOO MUCH ABOUT DIFFERENT THINGS: NOT AT ALL
1. FEELING NERVOUS, ANXIOUS, OR ON EDGE: NOT AT ALL
5. BEING SO RESTLESS THAT IT IS HARD TO SIT STILL: NOT AT ALL
7. FEELING AFRAID AS IF SOMETHING AWFUL MIGHT HAPPEN: NOT AT ALL
IF YOU CHECKED OFF ANY PROBLEMS ON THIS QUESTIONNAIRE, HOW DIFFICULT HAVE THESE PROBLEMS MADE IT FOR YOU TO DO YOUR WORK, TAKE CARE OF THINGS AT HOME, OR GET ALONG WITH OTHER PEOPLE: NOT DIFFICULT AT ALL

## 2020-12-23 ASSESSMENT — MIFFLIN-ST. JEOR: SCORE: 1554.55

## 2020-12-23 NOTE — PATIENT INSTRUCTIONS
Dysmenorrhea or painful menstruation     If you have painful periods consider trying the following:      Note on your calendar the beginning and end of each of your periods.       Try to anticipate your period by a couple days and take ibuprofen(Advil or Motrin) 800 mg three times a day for a day or two before your period starts or you can use naproxen (Aleve) 500 mg twice a day.       When your period starts continue with the ibuprofen for as long as you have cramping.       If the ibuprofen doesn't work completely you could add acetaminophen (Tylenol) 650 mg every four hours or 1000 mg every 6 hours      You can also use heat to your abdomen or back either in a tub or shower or with a heating pad.  You may also purchase Thermacare which stick to your abdomen or back and supply heat for up to 8 hours.       During your period try to get a good amount of sleep, eat a healthy well balanced diet, and make sure you drink plenty of water    If these methods do not improve you symptoms make an appointment to see your midwife to discuss hormonal options.    Please call with questions/concerns:    [facility]  [phone]    Preconception Care    If you are thinking about becoming pregnant in the near future or actively trying to conceive we want to make sure you are as healthy as possible before becoming pregnant. By meeting with your midwife or provider prior to getting pregnant it allows us to address any health needs that can affect pregnancy. Please discuss your personal and family history with your midwife in order for us to identify any risk factors      If you wish to attempt pregnancy stop whichever form of contraception you use. If you have an IUD it can be removed in the office and you can start trying right away. If you take OCPs finish current pack, cycle, and then you can actively start trying      Make sure all the medications you are taking are safe for pregnancy. This is especially important  for women with chronic health conditions such as diabetes, seizure disorders, and/or hypertension. If you are unsure if your medications are safe we can discuss them with you, do not abruptly stop taking something if you've been prescribed a medication by a medical provider      Folic acid 400-800 mcg per day by mouth daily, begin this routine 1 to 12 months prior to planned conception, and continue through at least 13 weeks gestation. Some prenatal/multi-vitamins contain enough folic acid       Be up to date on all your vaccines. Avoid pregnancy for 1 month after administration of varicella/ Rubella (MMR) vaccines      We encourage all women to be at healthy BMI (<26) when attempting pregnancy, it is healthier for both you and your baby. If you are overweight you can make a healthy choice by eating better and exercising more. Waiting to get pregnant at a healthy weight is an excellent choice.                                       Eat a healthy, well-balanced diet containing lots of fresh fruit and vegetables (frozen without added sugar is okay), protein, and healthy carbohydrates such as whole wheat breads and pastas      Exercise regularly. If you are wishing to get pregnant maintain normal exercise routine. If you don't exercise this is a great time for light activity daily such as walking/swimming      Limit caffeine intake to less than 200 mg per day, typically 1-2 cups of regular coffee is about 200 mg.       Avoid cigarettes, alcohol, and recreational drug use while attempting pregnancy. If you are actively trying to conceive but you get your period or a negative pregnancy test it is okay to have alcohol in moderation until you are actively trying again      If you have the potential to toxic chemical exposures in your work place or home please use special precautions    Menstrual Cycles      Knowing your cycle is incredibly important when attempting pregnancy      Your cycle begins day 1 of your period and  goes until the 1st day of your next period. Typically women have 28-32 day cycles. If your cycles are shorter or longer it can be a normal variation.       Women typically ovulate in the middle of their cycle      There are many great apps that can help you track you cycle monthly to establish when you are ovulating      You may also start taking your temperature daily with a basal body temp thermometer to help identify when you ovulate      There are also ovulation predictor kits available over the counter at the pharmacy      If you want more information please contact your midwife      It can take up to 1 year for a woman under the age of 35 or 6 months for a woman over the age of 35 to conceive. If it takes longer than this we would like to evaluate you for fertility issues.

## 2020-12-23 NOTE — PROGRESS NOTES
"  SUBJECTIVE:                                                   Bev Strong is a 30 year old female who presents to clinic today for the following health issue(s):  Patient presents with:  Abnormal Bleeding Problem  irregular cycles, no cycle 12/2020, neg UPT 12/10, 12/14    HPI:  Bev states that she went to [facility] in 1/2020.  While she was there, she had infibulation takedown; was able to conceive in 3/2020.  She states that when she was 8w pregnant, she had a miscarriage and took medication to pass POC.  After taking meds, she developed a fever, was found to have an infection, and had D&C.  She states that after the D&C, she had a resolution of signs and symptoms.  In 8/2020, she states that she was having pain in back/low abd, had US, then had HSG.  She states that in 9/2020, she did not have menstrual cycle.  In 10/2020, she states that she was still having pain, had \"HSG and flushing of my tubes\".  She states that she also had UTI at that time and was on abx.  She states that after the procedure in Oct, she then had 3 days of bleeding.  11/2020, she states that she had a \"normal cycle\" with LMP 11/7.  She states that since November, she has not had a menstrual cycle.  Had \"light spotting\" 12/11-12/17.      Prior to 1/2020, she states cycles were regular, q 28-30 days, were normal and lasted 5 days.     She states that she would like to have a \"workup done\" in order to make sure \"every thing is OK to conceive\".  She had neg home UPT 12/10 and 12/14.  She denies dysuria, abd/pelvic/back pain, vaginal signs and symptoms.    Bev does not have her medical records from [facility].  Histories updated based on patient recall.       Patient's last menstrual period was 11/07/2020 (exact date)..     Patient is sexually active, No obstetric history on file..  Using oral contraceptives for contraception.    reports that she has never smoked. She has never used smokeless tobacco.      Health maintenance updated:  " "no    Today's PHQ-2 Score:   PHQ-2 ( 1999 Pf[employer]) 12/23/2020   Q1: Little interest or pleasure in doing things 0   Q2: Feeling down, depressed or hopeless 0   PHQ-2 Score 0     Today's PHQ-9 Score:   PHQ-9 SCORE 12/23/2020   PHQ-9 Total Score 0     Today's STEPHEN-7 Score:   STEPHEN-7 SCORE 12/23/2020   Total Score 0       Problem list and histories reviewed & adjusted, as indicated.  Additional history: as documented.    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     Past Surgical History:   Procedure Laterality Date     DILATION AND CURETTAGE  03/2020     DE INFIBULATION TAKEDOWN  01/2020    done in [facility] HYSTEROSONOGRAPHY  08/2020     US HYSTEROSONOGRAPHY  10/2020    \"flushed tubes\" in [facility]      Social History     Tobacco Use     Smoking status: Never Smoker     Smokeless tobacco: Never Used   Substance Use Topics     Alcohol use: Never     Frequency: Never     Binge frequency: Never      Problem (# of Occurrences) Relation (Name,Age of Onset)    Hypertension (2) Father, Maternal Grandfather    No Known Problems (6) Mother, Sister, Brother, Maternal Grandmother, Paternal Grandmother, Other            Current Outpatient Medications   Medication Sig     Prenatal Vit-Fe Fumarate-FA (PRENATAL VITAMIN PLUS LOW IRON) 27-1 MG TABS Take 1 tablet by mouth daily (with breakfast)     Acetaminophen (TYLENOL PO) Take 500 mg by mouth     No current facility-administered medications for this visit.      No Known Allergies    ROS:  12 point review of systems negative other than symptoms noted below or in the HPI.  Genitourinary: Irregular Menses  No urinary frequency or dysuria, bladder or kidney problems, POSITIVE for:, irregular menses      OBJECTIVE:     /60   Pulse 62   Ht 1.68 m (5' 6.14\")   Wt 82.6 kg (182 lb)   LMP 11/07/2020 (Exact Date)   Breastfeeding No   BMI 29.25 kg/m    Body mass index is 29.25 kg/m .    Exam:  Constitutional:  Appearance: Well nourished, " well developed alert, in no acute distress  Neck:  Lymph Nodes:  No lymphadenopathy present; Thyroid:  Gland size normal, nontender, no nodules or masses present on palpation  Gastrointestinal:  Abdominal Examination:  Abdomen nontender to palpation, tone normal without rigidity or guarding, no masses present, umbilicus without lesions; Liver/Spleen:  No hepatomegaly present, liver nontender to palpation; Hernias:  No hernias present  Neurologic:  Mental Status:  Oriented X3.  Normal strength and tone, sensory exam grossly normal, mentation intact and speech normal.    No Pelvic Exam performed     In-Clinic Test Results:  Results for orders placed or performed in visit on 12/23/20 (from the past 24 hour(s))   CBC with platelets differential   Result Value Ref Range    WBC 10.1 4.0 - 11.0 10e9/L    RBC Count 4.85 3.8 - 5.2 10e12/L    Hemoglobin 14.2 11.7 - 15.7 g/dL    Hematocrit 43.2 35.0 - 47.0 %    MCV 89 78 - 100 fl    MCH 29.3 26.5 - 33.0 pg    MCHC 32.9 31.5 - 36.5 g/dL    RDW 13.5 10.0 - 15.0 %    Platelet Count 324 150 - 450 10e9/L    % Neutrophils 59.6 %    % Lymphocytes 27.8 %    % Monocytes 11.2 %    % Eosinophils 1.1 %    % Basophils 0.3 %    Absolute Neutrophil 6.0 1.6 - 8.3 10e9/L    Absolute Lymphocytes 2.8 0.8 - 5.3 10e9/L    Absolute Monocytes 1.1 0.0 - 1.3 10e9/L    Absolute Eosinophils 0.1 0.0 - 0.7 10e9/L    Absolute Basophils 0.0 0.0 - 0.2 10e9/L    Diff Method Automated Method    UA with Microscopic reflex to Culture    Specimen: Midstream Urine   Result Value Ref Range    Color Urine Yellow     Appearance Urine Clear     Glucose Urine Negative NEG^Negative mg/dL    Bilirubin Urine Negative NEG^Negative    Ketones Urine Negative NEG^Negative mg/dL    Specific Gravity Urine 1.015 1.003 - 1.035    pH Urine 8.0 (H) 5.0 - 7.0 pH    Protein Albumin Urine Negative NEG^Negative mg/dL    Urobilinogen Urine 0.2 0.2 - 1.0 EU/dL    Nitrite Urine Negative NEG^Negative    Blood Urine Negative NEG^Negative     Leukocyte Esterase Urine Negative NEG^Negative    Source Midstream Urine     WBC Urine 0 - 5 OTO5^0 - 5 /HPF    RBC Urine O - 2 OTO2^O - 2 /HPF    Squamous Epithelial /LPF Urine Few FEW^Few /LPF    Bacteria Urine Few (A) NEG^Negative /HPF   HCG qualitative urine - CSC and Range   Result Value Ref Range    HCG Qual Urine Negative NEG^Negative       ASSESSMENT/PLAN:                                                        ICD-10-CM    1. Abnormal uterine bleeding (AUB)  N93.9 CBC with platelets differential     TSH with free T4 reflex     US Transvaginal Non OB     HCG quantitative pregnancy     UA with Microscopic reflex to Culture     HCG qualitative urine - CSC and Range   2. Irregular menses  N92.6 CBC with platelets differential     TSH with free T4 reflex     US Transvaginal Non OB     HCG quantitative pregnancy     UA with Microscopic reflex to Culture     HCG qualitative urine - CSC and Range   3. 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 Amenorrhea  N91.2 Prenatal Vit-Fe Fumarate-FA (PRENATAL VITAMIN PLUS LOW IRON) 27-1 MG TABS         -Counseled on need for additional testing.  Recommedations: CBC, TSH, HCG urine/quant, pelvic US, UA/UC.  Will not be able to do pelvic US today.  Will plan for labs today, US with f/u visit on same day.  -recommended starting PNV or folic acid daily starting now  -counseled most likely will need to transfer care to OB/GYN if further workup needed  -encouraged to request/get med records from [facility] to confirm procedures done earlier this year  -will call and notify of results  -return to clinic 1-2 wks for US and visit  -will recommend OB/GYN consultation if still having irregular cycles/amenorrhea and/or desires consultation for preconception    ELANA Gutierrez [facility]

## 2020-12-24 VITALS
HEART RATE: 62 BPM | DIASTOLIC BLOOD PRESSURE: 60 MMHG | WEIGHT: 182 LBS | SYSTOLIC BLOOD PRESSURE: 122 MMHG | BODY MASS INDEX: 29.25 KG/M2 | HEIGHT: 66 IN

## 2020-12-24 ASSESSMENT — ANXIETY QUESTIONNAIRES: GAD7 TOTAL SCORE: 0

## 2020-12-25 LAB
B-HCG SERPL-ACNC: <1 IU/L (ref 0–5)
TSH SERPL DL<=0.005 MIU/L-ACNC: 0.84 MU/L (ref 0.4–4)

## 2020-12-26 ENCOUNTER — TELEPHONE (OUTPATIENT)
Dept: OBGYN | Facility: CLINIC | Age: 30
End: 2020-12-26

## 2020-12-26 NOTE — TELEPHONE ENCOUNTER
Called and spoke to patient, gave normal results.  Bev states that she would like to make sure that she can get pregnant again, would like to make sure that is possible.     Discussed cancelling appt with me on 1/6/2021 and keeping US and appt with Dr. Berumen on 1/12/2021.  Dr. Berumen can review US results at appt and make plan for further follow up if needed on 1/12.  Bev verbalized understanding and agrees with POC.      Charito HUITRON, [place], [facility]  [phone]'

## 2021-01-11 NOTE — PROGRESS NOTES
SUBJECTIVE:                                                   Bev Strong is a 31 year old female who presents to clinic today for the following health issue(s):  Patient presents with:  Follow Up: US results      Additional information: Irregular menses    HPI:  Bev is a pleasant 27 (actual) yo  who presents for follow up on her ultrasound and continued work up of her new onset irregular menses. Her /partner lives in [facility]. He has fathered a child before but she has made him complete a semen analysis that was normal. She had a miscarriage in 2020 after one month of trying to conceive. She subsequently had a hysterosalpingogram and she was told that her left fallopian tube is narrow. She states that she was told it was from an infection after her miscarriage. She would like her HSG repeated. She is worried about her ovarian reserve. She does not have any history of thyroid disease or diabetes. Prior to her irregularly occurring menses in November her menses have otherwise been every 28 days. The last episode of unprotected intercourse was the first week of December. She had a negative HCG on .    Patient's last menstrual period was 2020 (exact date)..     Patient is not sexually active, .  Using none for contraception.    reports that she has never smoked. She has never used smokeless tobacco.    STD testing offered?  Declined    Health maintenance updated:  no    Today's PHQ-2 Score:   PHQ-2 (  [employer]) 2020   Q1: Little interest or pleasure in doing things 0   Q2: Feeling down, depressed or hopeless 0   PHQ-2 Score 0     Today's PHQ-9 Score:   PHQ-9 SCORE 2020   PHQ-9 Total Score 0     Today's STEPHEN-7 Score:   STEPHEN-7 SCORE 2020   Total Score 0       Problem list and histories reviewed & adjusted, as indicated.  Additional history: as documented.    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     Past  "Surgical History:   Procedure Laterality Date     DILATION AND CURETTAGE  03/2020     [place] INFIBULATION TAKEDOWN  01/2020    done in [facility]     US HYSTEROSONOGRAPHY  08/2020     US HYSTEROSONOGRAPHY  10/2020    \"flushed tubes\" in [facility]      Social History     Tobacco Use     Smoking status: Never Smoker     Smokeless tobacco: Never Used   Substance Use Topics     Alcohol use: Never     Frequency: Never     Binge frequency: Never      Problem (# of Occurrences) Relation (Name,Age of Onset)    Hypertension (2) Father, Maternal Grandfather    No Known Problems (6) Mother, Sister, Brother, Maternal Grandmother, Paternal Grandmother, Other            Current Outpatient Medications   Medication Sig     Acetaminophen (TYLENOL PO) Take 500 mg by mouth     Prenatal Vit-Fe Fumarate-FA (PRENATAL VITAMIN PLUS LOW IRON) 27-1 MG TABS Take 1 tablet by mouth daily (with breakfast)     No current facility-administered medications for this visit.      No Known Allergies    ROS:  12 point review of systems negative other than symptoms noted below or in the HPI.  No urinary frequency or dysuria, bladder or kidney problems      OBJECTIVE:     /60   Ht 1.683 m (5' 6.25\")   Wt 82.7 kg (182 lb 6.4 oz)   LMP 11/07/2020 (Exact Date)   Breastfeeding No   BMI 29.22 kg/m    Body mass index is 29.22 kg/m .    Exam:  Constitutional:  Appearance: Well nourished, well developed alert, in no acute distress  Skin: General Inspection:  No rashes present, no lesions present, no areas of discoloration.  Neurologic:  Mental Status:  Oriented X3.  Normal strength and tone, sensory exam grossly normal, mentation intact and speech normal.    Psychiatric:  Mentation appears normal and affect normal/bright.     In-Clinic Test Results:  Results for orders placed or performed in visit on 01/12/21 (from the past 24 hour(s))   Hemoglobin A1c   Result Value Ref Range    Hemoglobin A1C 5.7 (H) 0 - 5.6 %       ASSESSMENT/PLAN:                                 "                        ICD-10-CM    1. Amenorrhea  N91.2 Anti-Mullerian hormone     Progesterone     Follicle stimulating hormone     Estradiol     Testosterone Free and Total     Hemoglobin A1c     Prolactin     As pregnancy has been ruled out the likely cause of her irregular menses is anovulation. I recommend screening for common causes such as diabetes, thyroid disease and premature ovarian insufficiency. Her only risk for premature ovarian insufficiency is her history of Tuberculosis in the past. Recommendations will be made based on her test results. Given her hemoglobin Q1C in the prediabetic range, metformin will be part of her initial recommendation.    Francesca Berumen MD  [facility] [place]

## 2021-01-12 ENCOUNTER — TELEPHONE (OUTPATIENT)
Dept: OBGYN | Facility: CLINIC | Age: 31
End: 2021-01-12

## 2021-01-12 ENCOUNTER — OFFICE VISIT (OUTPATIENT)
Dept: OBGYN | Facility: CLINIC | Age: 31
End: 2021-01-12
Payer: MEDICAID

## 2021-01-12 ENCOUNTER — ANCILLARY PROCEDURE (OUTPATIENT)
Dept: ULTRASOUND IMAGING | Facility: CLINIC | Age: 31
End: 2021-01-12
Payer: MEDICAID

## 2021-01-12 VITALS
SYSTOLIC BLOOD PRESSURE: 104 MMHG | WEIGHT: 182.4 LBS | DIASTOLIC BLOOD PRESSURE: 60 MMHG | HEIGHT: 66 IN | BODY MASS INDEX: 29.32 KG/M2

## 2021-01-12 DIAGNOSIS — N93.9 ABNORMAL UTERINE BLEEDING (AUB): ICD-10-CM

## 2021-01-12 DIAGNOSIS — N91.2 AMENORRHEA: Primary | ICD-10-CM

## 2021-01-12 DIAGNOSIS — N92.6 IRREGULAR MENSES: ICD-10-CM

## 2021-01-12 LAB
ESTRADIOL SERPL-MCNC: 51 PG/ML
FSH SERPL-ACNC: 7.9 IU/L
HBA1C MFR BLD: 5.7 % (ref 0–5.6)
PROGEST SERPL-MCNC: <0.2 NG/ML
PROLACTIN SERPL-MCNC: 7 UG/L (ref 3–27)

## 2021-01-12 PROCEDURE — 83036 HEMOGLOBIN GLYCOSYLATED A1C: CPT | Performed by: OBSTETRICS & GYNECOLOGY

## 2021-01-12 PROCEDURE — 99214 OFFICE O/P EST MOD 30 MIN: CPT | Performed by: OBSTETRICS & GYNECOLOGY

## 2021-01-12 PROCEDURE — 83001 ASSAY OF GONADOTROPIN (FSH): CPT | Performed by: OBSTETRICS & GYNECOLOGY

## 2021-01-12 PROCEDURE — 76830 TRANSVAGINAL US NON-OB: CPT | Performed by: OBSTETRICS & GYNECOLOGY

## 2021-01-12 PROCEDURE — 84146 ASSAY OF PROLACTIN: CPT | Performed by: OBSTETRICS & GYNECOLOGY

## 2021-01-12 PROCEDURE — 84144 ASSAY OF PROGESTERONE: CPT | Performed by: OBSTETRICS & GYNECOLOGY

## 2021-01-12 PROCEDURE — 99000 SPECIMEN HANDLING OFFICE-LAB: CPT | Performed by: OBSTETRICS & GYNECOLOGY

## 2021-01-12 PROCEDURE — 82670 ASSAY OF TOTAL ESTRADIOL: CPT | Performed by: OBSTETRICS & GYNECOLOGY

## 2021-01-12 PROCEDURE — 83520 IMMUNOASSAY QUANT NOS NONAB: CPT | Mod: 90 | Performed by: OBSTETRICS & GYNECOLOGY

## 2021-01-12 PROCEDURE — 84270 ASSAY OF SEX HORMONE GLOBUL: CPT | Performed by: OBSTETRICS & GYNECOLOGY

## 2021-01-12 PROCEDURE — 36415 COLL VENOUS BLD VENIPUNCTURE: CPT | Performed by: OBSTETRICS & GYNECOLOGY

## 2021-01-12 PROCEDURE — 84403 ASSAY OF TOTAL TESTOSTERONE: CPT | Mod: 90 | Performed by: OBSTETRICS & GYNECOLOGY

## 2021-01-12 ASSESSMENT — MIFFLIN-ST. JEOR: SCORE: 1553.08

## 2021-01-12 NOTE — TELEPHONE ENCOUNTER
Please schedule for surgery:     Patient Name:  Bev Strong ([id number]).  :  1990        Physician requests assist from:  None  Requested Dates:  Wednesday  OR days  Schedule based on:  availability  Amount of time needed for the procedure:  30 minutes  Expected time off from work:  1 day  Surgeon:  Francesca Berumen MD  Surgery permit to read:  Hysterosalpingogram  Preop Needed:  No  Location for surgery to performed:   [facility]  Anesthesia:  none   No Known Allergies  Nickel allergy:  No  EMB: No     DIAGNOSIS:  Infertility     Special instructions:  n/a  Vendor Rep:  n/a  Meds Needed: None

## 2021-01-12 NOTE — PROGRESS NOTES
Please schedule for surgery:    Patient Name:  Bev Strong ([id number]).  :  1990      Physician requests assist from:  None  Requested Dates:  Wednesday  OR days  Schedule based on:  availability  Amount of time needed for the procedure:  30 minutes  Expected time off from work:  1 day  Surgeon:  Francesca Berumen MD  Surgery permit to read:  Hysterosalpingogram  Preop Needed:  No  Location for surgery to performed:   [facility]  Anesthesia:  none   No Known Allergies  Nickel allergy:  No  EMB: No    DIAGNOSIS:  Infertility    Special instructions:  n/a  Vendor Rep:  n/a  Meds Needed: None

## 2021-01-13 NOTE — TELEPHONE ENCOUNTER
Spoke to pt and advised to call on day one of her period to have this scheduled days 6-10    Kindra Mtz  Surgery Scheduler

## 2021-01-14 LAB
MIS SERPL-MCNC: 0.52 NG/ML (ref 0.18–11.71)
SHBG SERPL-SCNC: 42 NMOL/L (ref 30–135)
TESTOST FREE SERPL-MCNC: 0.29 NG/DL (ref 0.13–0.92)
TESTOST SERPL-MCNC: 19 NG/DL (ref 8–60)

## 2021-01-15 ENCOUNTER — TELEPHONE (OUTPATIENT)
Dept: OBGYN | Facility: CLINIC | Age: 31
End: 2021-01-15

## 2021-01-15 NOTE — TELEPHONE ENCOUNTER
1/12/21 OV with Dr Berumen for irregular menses    See pt results - pt was informed at 1415 of results and recommended phone visit to discuss.  1/19/21 scheduled phone visit with ME    Fadumo López RN on 1/15/2021 at 2:57 PM

## 2021-01-18 NOTE — PROGRESS NOTES
"Bev Strong is a 31 year old female who is being evaluated via a billable telephone visit.      What phone number would you like to be contacted at? [phone]  How would you like to obtain your AVS? Mail a copy      SUBJECTIVE:                                                   Bev Strong is a 31 year old female who presents for virtual visit today for the following health issue(s):  Patient presents with:  Follow Up: lab results      Additional information: patient states she has not had her period and is not pregnant. She needs a new Rx of  Prenatal vitamins, says she had them at the car wash and they got wet.    HPI:  Bev is still not having regular menses. Her  is still in [place]. She would like to discuss her lab results.     No LMP recorded..     Patient is not sexually active, .  Using none for contraception.    reports that she has never smoked. She has never used smokeless tobacco.      Health maintenance updated:  no    Today's PHQ-2 Score:   PHQ-2 (  [employer]) 2020   Q1: Little interest or pleasure in doing things 0   Q2: Feeling down, depressed or hopeless 0   PHQ-2 Score 0     Today's PHQ-9 Score:   PHQ-9 SCORE 2020   PHQ-9 Total Score 0     Today's STEPHEN-7 Score:   STEPHEN-7 SCORE 2020   Total Score 0       Problem list and histories reviewed & adjusted, as indicated.  Additional history: as documented.    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     Past Surgical History:   Procedure Laterality Date     DILATION AND CURETTAGE  2020     [place] INFIBULATION TAKEDOWN  2020    done in [facility] HYSTEROSONOGRAPHY  2020      HYSTEROSONOGRAPHY  10/2020    \"flushed tubes\" in [facility]      Social History     Tobacco Use     Smoking status: Never Smoker     Smokeless tobacco: Never Used   Substance Use Topics     Alcohol use: Never     Frequency: Never     Binge frequency: Never      Problem (# of Occurrences) " Relation (Name,Age of Onset)    Hypertension (2) Father, Maternal Grandfather    No Known Problems (6) Mother, Sister, Brother, Maternal Grandmother, Paternal Grandmother, Other            Current Outpatient Medications   Medication Sig     Acetaminophen (TYLENOL PO) Take 500 mg by mouth     metFORMIN (GLUCOPHAGE) 500 MG tablet Take 1 tablet (500 mg) by mouth 2 times daily (with meals)     Prenatal Vit-Fe Fumarate-FA (PRENATAL VITAMIN PLUS LOW IRON) 27-1 MG TABS Take 1 tablet by mouth daily (with breakfast)     No current facility-administered medications for this visit.      No Known Allergies      OBJECTIVE:     No vitals were obtained today due to virtual visit.    Physical Exam            ASSESSMENT/PLAN:                                                      Phone call duration: 11 minutes      ICD-10-CM    1. Prediabetes  R73.03 metFORMIN (GLUCOPHAGE) 500 MG tablet     Her blood work results were reviewed. She has a normal FSH but her AMH is less than one. She has an elevated hemoglobin A1C and was counseled to start routine exercise. She was also counseled about starting metformin to decrease insulin resistance and lead to the resumption of her menses. Dietary modification was advised as well. As she is not sexually active, we can proceed with scheduling her hysterosalpingogram at the first available appointment.     Francesca Berumen MD  [facility] [place]

## 2021-01-19 ENCOUNTER — VIRTUAL VISIT (OUTPATIENT)
Dept: OBGYN | Facility: CLINIC | Age: 31
End: 2021-01-19
Payer: MEDICAID

## 2021-01-19 DIAGNOSIS — R73.03 PREDIABETES: Primary | ICD-10-CM

## 2021-01-19 PROCEDURE — 99213 OFFICE O/P EST LOW 20 MIN: CPT | Mod: 95 | Performed by: OBSTETRICS & GYNECOLOGY

## 2021-01-19 NOTE — Clinical Note
Bev is not getting periods and her  is not around so she does not have to wait for a cycle for her HSG

## 2021-01-19 NOTE — PROGRESS NOTES
Patient called at 12:27pm to discuss test results from her phone visit scheduled earlier. There was no answer.  Francesca Berumen MD

## 2021-01-27 NOTE — TELEPHONE ENCOUNTER
Spoke w/Germaine at  to schedule as follows    2/17/2021 2:30p CHECK IN 2:05    Kindra Mtz  Surgery Scheduler

## 2021-01-27 NOTE — TELEPHONE ENCOUNTER
Per Dr. Berumen 1/19/21 note - can be done at anytime -   As she is not sexually active, we can proceed with scheduling her hysterosalpingogram at the first available appointment    Please call patient to schedule.

## 2021-02-17 ENCOUNTER — TELEPHONE (OUTPATIENT)
Dept: OBGYN | Facility: CLINIC | Age: 31
End: 2021-02-17

## 2021-02-17 NOTE — TELEPHONE ENCOUNTER
Patient requests orders for a labs to test her blood levels in order to refill her medication metFORMIN (GLUCOPHAGE) 500 MG tablet. Said she only has two days left. Okay to call with questions.

## 2021-02-17 NOTE — TELEPHONE ENCOUNTER
Called pt and explained she has refills available at her pharmacy. Continue Metformin as instructed to get things regulated: menses and blood glucose. No labs ordered or needed for refill. F/U w Dr MUÑIZ on 3/2 as scheduled and can discuss repeating A1C in the future.    Pt verbalized understanding, in agreement with plan, and voiced no further questions.  Fadumo López RN on 2/17/2021 at 3:08 PM

## 2021-04-27 DIAGNOSIS — N91.2 AMENORRHEA: ICD-10-CM

## 2021-04-27 RX ORDER — VITAMIN A, VITAMIN C, VITAMIN D-3, VITAMIN E, VITAMIN B-1, VITAMIN B-2, NIACIN, VITAMIN B-6, CALCIUM, IRON, ZINC, COPPER 4000; 120; 400; 22; 1.84; 3; 20; 10; 1; 12; 200; 27; 25; 2 [IU]/1; MG/1; [IU]/1; MG/1; MG/1; MG/1; MG/1; MG/1; MG/1; UG/1; MG/1; MG/1; MG/1; MG/1
1 TABLET ORAL
Qty: 30 TABLET | Refills: 3 | Status: SHIPPED | OUTPATIENT
Start: 2021-04-27

## 2021-04-27 NOTE — TELEPHONE ENCOUNTER
Requested Prescriptions   Pending Prescriptions Disp Refills     Prenatal Vit-Fe Fumarate-FA (PRENATAL VITAMIN PLUS LOW IRON) 27-1 MG TABS 30 tablet 3     Sig: Take 1 tablet by mouth daily (with breakfast)       There is no refill protocol information for this order        Last Written Prescription Date:  12/23/2020  Last Fill Quantity: 1 mg ,  # refills: 3   Last office visit: 1/12/2021 with prescribing provider:  Ata  Future Office Visit: none

## 2021-04-27 NOTE — TELEPHONE ENCOUNTER
Prescription approved per [facility] Refill Protocol.  Helena Albert RN on 4/27/2021 at 12:58 PM

## 2021-06-05 ENCOUNTER — HOSPITAL ENCOUNTER (EMERGENCY)
Facility: CLINIC | Age: 31
Discharge: HOME | End: 2021-06-05
Attending: EMERGENCY MEDICINE | Admitting: EMERGENCY MEDICINE
Payer: COMMERCIAL

## 2021-06-05 ENCOUNTER — APPOINTMENT (OUTPATIENT)
Dept: GENERAL RADIOLOGY | Facility: CLINIC | Age: 31
End: 2021-06-05
Attending: EMERGENCY MEDICINE
Payer: COMMERCIAL

## 2021-06-05 ENCOUNTER — APPOINTMENT (OUTPATIENT)
Dept: CT IMAGING | Facility: CLINIC | Age: 31
End: 2021-06-05
Attending: EMERGENCY MEDICINE
Payer: COMMERCIAL

## 2021-06-05 VITALS
BODY MASS INDEX: 29.09 KG/M2 | HEIGHT: 66 IN | WEIGHT: 181 LBS | TEMPERATURE: 98.1 F | HEART RATE: 69 BPM | DIASTOLIC BLOOD PRESSURE: 83 MMHG | SYSTOLIC BLOOD PRESSURE: 109 MMHG | OXYGEN SATURATION: 100 % | RESPIRATION RATE: 16 BRPM

## 2021-06-05 DIAGNOSIS — R09.89 PULMONARY AIR TRAPPING: ICD-10-CM

## 2021-06-05 DIAGNOSIS — R06.02 SHORTNESS OF BREATH: Primary | ICD-10-CM

## 2021-06-05 LAB
ANION GAP SERPL CALCULATED.3IONS-SCNC: 5 MMOL/L (ref 3–14)
BASOPHILS # BLD AUTO: 0 10E9/L (ref 0–0.2)
BASOPHILS NFR BLD AUTO: 0.4 %
BUN SERPL-MCNC: 10 MG/DL (ref 7–30)
CALCIUM SERPL-MCNC: 9.7 MG/DL (ref 8.5–10.1)
CHLORIDE SERPL-SCNC: 102 MMOL/L (ref 94–109)
CO2 SERPL-SCNC: 29 MMOL/L (ref 20–32)
CREAT SERPL-MCNC: 0.65 MG/DL (ref 0.52–1.04)
DIFFERENTIAL METHOD BLD: ABNORMAL
EOSINOPHIL # BLD AUTO: 0.2 10E9/L (ref 0–0.7)
EOSINOPHIL NFR BLD AUTO: 1.5 %
ERYTHROCYTE [DISTWIDTH] IN BLOOD BY AUTOMATED COUNT: 13.3 % (ref 10–15)
GFR SERPL CREATININE-BSD FRML MDRD: >90 ML/MIN/{1.73_M2}
GLUCOSE SERPL-MCNC: 94 MG/DL (ref 70–99)
HCG SERPL QL: NEGATIVE
HCT VFR BLD AUTO: 42.9 % (ref 35–47)
HGB BLD-MCNC: 14 G/DL (ref 11.7–15.7)
IMM GRANULOCYTES # BLD: 0.1 10E9/L (ref 0–0.4)
IMM GRANULOCYTES NFR BLD: 0.6 %
LYMPHOCYTES # BLD AUTO: 3.2 10E9/L (ref 0.8–5.3)
LYMPHOCYTES NFR BLD AUTO: 27.8 %
MCH RBC QN AUTO: 30 PG (ref 26.5–33)
MCHC RBC AUTO-ENTMCNC: 32.6 G/DL (ref 31.5–36.5)
MCV RBC AUTO: 92 FL (ref 78–100)
MONOCYTES # BLD AUTO: 1.1 10E9/L (ref 0–1.3)
MONOCYTES NFR BLD AUTO: 9.7 %
NEUTROPHILS # BLD AUTO: 6.8 10E9/L (ref 1.6–8.3)
NEUTROPHILS NFR BLD AUTO: 60 %
NRBC # BLD AUTO: 0 10*3/UL
NRBC BLD AUTO-RTO: 0 /100
PLATELET # BLD AUTO: 314 10E9/L (ref 150–450)
POTASSIUM SERPL-SCNC: 3.7 MMOL/L (ref 3.4–5.3)
RBC # BLD AUTO: 4.67 10E12/L (ref 3.8–5.2)
SODIUM SERPL-SCNC: 136 MMOL/L (ref 133–144)
TROPONIN I SERPL-MCNC: <0.015 UG/L (ref 0–0.04)
TSH SERPL DL<=0.005 MIU/L-ACNC: 0.74 MU/L (ref 0.4–4)
WBC # BLD AUTO: 11.4 10E9/L (ref 4–11)

## 2021-06-05 PROCEDURE — 94640 AIRWAY INHALATION TREATMENT: CPT

## 2021-06-05 PROCEDURE — 80048 BASIC METABOLIC PNL TOTAL CA: CPT | Performed by: EMERGENCY MEDICINE

## 2021-06-05 PROCEDURE — 99285 EMERGENCY DEPT VISIT HI MDM: CPT | Mod: 25

## 2021-06-05 PROCEDURE — 93005 ELECTROCARDIOGRAM TRACING: CPT

## 2021-06-05 PROCEDURE — 250N000009 HC RX 250: Performed by: EMERGENCY MEDICINE

## 2021-06-05 PROCEDURE — 999N000157 HC STATISTIC RCP TIME EA 10 MIN

## 2021-06-05 PROCEDURE — 84443 ASSAY THYROID STIM HORMONE: CPT | Performed by: EMERGENCY MEDICINE

## 2021-06-05 PROCEDURE — 84484 ASSAY OF TROPONIN QUANT: CPT | Performed by: EMERGENCY MEDICINE

## 2021-06-05 PROCEDURE — 71275 CT ANGIOGRAPHY CHEST: CPT

## 2021-06-05 PROCEDURE — 85025 COMPLETE CBC W/AUTO DIFF WBC: CPT | Performed by: EMERGENCY MEDICINE

## 2021-06-05 PROCEDURE — 71046 X-RAY EXAM CHEST 2 VIEWS: CPT

## 2021-06-05 PROCEDURE — 84703 CHORIONIC GONADOTROPIN ASSAY: CPT | Performed by: EMERGENCY MEDICINE

## 2021-06-05 PROCEDURE — 250N000011 HC RX IP 250 OP 636: Performed by: EMERGENCY MEDICINE

## 2021-06-05 RX ORDER — IOPAMIDOL 755 MG/ML
98 INJECTION, SOLUTION INTRAVASCULAR ONCE
Status: COMPLETED | OUTPATIENT
Start: 2021-06-05 | End: 2021-06-05

## 2021-06-05 RX ORDER — SODIUM CHLORIDE FOR INHALATION 3 %
3 VIAL, NEBULIZER (ML) INHALATION
Status: DISCONTINUED | OUTPATIENT
Start: 2021-06-05 | End: 2021-06-05 | Stop reason: HOSPADM

## 2021-06-05 RX ORDER — ALBUTEROL SULFATE 90 UG/1
2 AEROSOL, METERED RESPIRATORY (INHALATION) EVERY 6 HOURS PRN
Qty: 18 G | Refills: 0 | Status: SHIPPED | OUTPATIENT
Start: 2021-06-05

## 2021-06-05 RX ADMIN — IOPAMIDOL 98 ML: 755 INJECTION, SOLUTION INTRAVENOUS at 19:22

## 2021-06-05 RX ADMIN — SODIUM CHLORIDE 69 ML: 9 INJECTION, SOLUTION INTRAVENOUS at 19:22

## 2021-06-05 RX ADMIN — SODIUM CHLORIDE SOLN NEBU 3% 3 ML: 3 NEBU SOLN at 19:45

## 2021-06-05 ASSESSMENT — ENCOUNTER SYMPTOMS
SHORTNESS OF BREATH: 1
FEVER: 0
FREQUENCY: 0
ABDOMINAL PAIN: 0
DYSURIA: 0
SORE THROAT: 0
VOMITING: 0
COUGH: 0
HEMATURIA: 0
DIARRHEA: 0
CHEST TIGHTNESS: 0

## 2021-06-05 ASSESSMENT — MIFFLIN-ST. JEOR: SCORE: 1542.76

## 2021-06-05 NOTE — ED PROVIDER NOTES
"  History   Chief Complaint:  Shortness of Breath     The history is provided by the patient.      Bev Strong is a 31 year old female with history of tuberculosis and self-reported remote history of asthma, who presents alone for evaluation of constant shortness of breath, waxing and waning in severity, gradually worsening for the last 4 days. Patient states she had some shortness of breath a while ago and was given an inhaler. She no longer has access to this and states her shortness of breath has worsened to the point she has to \"force air into her lungs\" prompting her presentation.     Here, patient endorses some upper mid-abdominal soreness due to increased work of breathing She denies any chest pain, chest tightness, sore throat, fever, cough, back pain, other abdominal pain, vomiting, diarrhea, urinary issues, vaginal bleeding or discharge. She states she has not been vaccinated for COVID-19 as she previously had a miscarriage and was told \"to not get a vaccine due to this miscarriage.\"     Review of Systems   Constitutional: Negative for fever.   HENT: Negative for sore throat.    Respiratory: Positive for shortness of breath. Negative for cough and chest tightness.    Cardiovascular: Negative for chest pain.   Gastrointestinal: Negative for abdominal pain, diarrhea and vomiting.   Genitourinary: Negative for dysuria, frequency, hematuria, urgency, vaginal bleeding and vaginal discharge.   All other systems reviewed and are negative.    Allergies:  No Known Drug Allergies     Medications:  Metformin    Past Medical History:    Tuberculosis  Female genital mutilation with infibulation   Asthma - self reported    Past Surgical History:    D & C  Infibulation takedown  Hysterosonography x2     Family History:    Father: hypertension    Social History:  The patient presents alone to the ED.   The patient has had a previous miscarriage.     Physical Exam     Patient Vitals for the past 24 hrs:   BP Temp " "Pulse Resp SpO2 Height Weight   06/05/21 2000 109/83 -- 69 -- 100 % -- --   06/05/21 1945 102/62 -- 75 -- 99 % -- --   06/05/21 1900 111/77 -- 76 -- 100 % -- --   06/05/21 1742 102/53 98.1  F (36.7  C) 83 16 97 % 1.676 m (5' 6\") 82.1 kg (181 lb)     Physical Exam  General: Alert, appears well-developed and well-nourished. Cooperative.     In no acute distress  HEENT:  Head:  Atraumatic  Ears:  External ears are normal  Mouth/Throat:  Oropharynx is without erythema or exudate and mucous membranes are moist.   Eyes:   Conjunctivae normal and EOM are normal. No scleral icterus.  CV:  Normal rate, regular rhythm, normal heart sounds and radial pulses are 2+ and symmetric.  No murmur.  Resp:  Breath sounds are clear bilaterally, no wheezing.     Non-labored, no retractions or accessory muscle use  GI:  Abdomen is soft, no distension, no tenderness. No rebound or guarding.  No CVA tenderness bilaterally  MS:  Normal range of motion. No edema.    Normal strength in all 4 extremities.     Back atraumatic.    No midline cervical, thoracic, or lumbar tenderness  Skin:  Warm and dry.  No rash or lesions noted.  Neuro: Alert. Normal strength.  GCS: 15  Psych:  Normal mood and affect.    Emergency Department Course   ECG  ECG taken at 1834, ECG read at 1846 by Dr. Jewel Umana MD  Normal sinus rhythm  Normal ECG  No prior EKG for comparison.    Rate 77 bpm. MS interval 132 ms. QRS duration 80 ms. QT/QTc 368/416 ms. P-R-T axes 37 70 35.     Imaging:  XR Chest:  IMPRESSION:   1. Findings concerning for small left apical pneumothorax, similar in   appearance to the prior study dated 1/17/2019. CT may be helpful for   further evaluation.   2. No other evidence of acute cardiopulmonary disease is seen.   Reading per radiology.    CT Chest Pulmonary Embolism w Contrast:  IMPRESSION:   1.  Air trapping in the left apical region likely represents   emphysematous changes in this location and appear to cause the linear   density in the " left apex seen on the chest x-ray. No pneumothorax is   identified.   2.  Small amount of fluid/scarring in the left apex subpleural region   is of uncertain clinical significance and etiology.   3.  No evidence for pulmonary artery embolism.   4.  Evidence of chronic granulomatous disease of the liver and spleen.   5.  Probable mild scarring in the inferior medial lingula with   associated mild bronchiectasis. The left upper lung lobe is somewhat   hypoaerated. This appears likely chronic.   Reading per radiology.    Laboratory:   CBC: WBC 11.4 (H), HGB 14.0,      BMP: AWNL (Creatinine 0.65)    Troponin (Collected 1742): <0.015    TSH with free T4 reflex: 0.74    HCG Qualitative pregnancy (blood): Negative    Emergency Department Course:    Reviewed:  I reviewed nursing notes, vitals, past medical history and care everywhere    Assessments:  1756 I obtained history and examined the patient as noted above.     1826 I rechecked the patient and explained findings. CT will be ordered.      2030 Rechecked patient and patient will be discharged.     Consults:   1820 I spoke with Radiology service regarding patient's presentation, findings, and plan of care. Left apical pneumothorax noted on x-ray.     1950 I spoke with Radiology service regarding patient's presentation, findings, and plan of care.    Interventions:  1945 Sodium chloride 3 mL Nebulization    Disposition:  The patient was discharged to home.     PERC Rule for risk stratifying PE to low risk (calculator)  Background  Risk stratifies patients to low risk of PE if all 8 criteria are present including age <50, heart rate <100, O2 Sat >94%, no unilateral leg edema, no hemoptysis, no recent surgery or trauma, no prior VTE, and no hormone use.  Data  31 year old  has Female genital mutilation with infibulation on their problem list.  @[email or web address]@   has a past surgical history that includes US Hysterosonography (08/2020); pr infibulation takedown (01/2020); US  Hysterosonography (10/2020); and Dilation and curettage (03/2020).  Pulse: 83  SpO2: 97 %  Criteria   Of  8 possible items (all criteria must be present):  Age <50 years  Heart rate <100 bpm  Oxygen Saturation >94%  No unilateral leg swelling  No hemoptysis  No surgery or trauma within 4 weeks  No prior DVT or PE  No hormone use (oral, transdermal and intravaginal estrogens)  Interpretation  All eight criteria are met AND low clinical PE suspicion: No further evaluation for PE required      Impression & Plan   Medical Decision Making:  Patient is a 31-year-old female who presents with 4 days of shortness of breath.  Initial chest x-ray was concerning for potential apical pneumothorax.  CT imaging was ultimately obtained as this has been seen on prior chest x-ray imaging from years ago.  CT imaging shows air trapping in the left apical region likely representing emphysematous change or potential scar tissue.  Reassuringly, no evidence of pulmonary embolism.  Patient has chronic granulomatous disease of the liver and spleen likely indicative of her historical TB infections.  Patient is afebrile here and has no active cough or sore throat.  Her lungs are clear with no evidence of wheezing.  Lower concern for reactive airway disease.  She states an albuterol inhaler would likely be helpful. I think this is unlikely due to no presence of wheezing on initial examination, but I think low harm to providing her with a refill of her albuterol inhaler tonight.  Blood work grossly unremarkable. Patient is not pregnant. EKG shows no concerning ischemic changes and troponin undetectable, lower concern for ACS.  No evidence of thyroid dysfunction.  No evidence of anemia.  Close outpatient follow-up encouraged with her primary care provider if the shortness of breath persists in upcoming days0.  After all questions answered & return precautions understood, discharged home.    Unfortunate we did not have a chance to swab the patient  for COVID-19.  If she were to return with continued shortness of breath symptoms, I certainly would assess her for COVID-19 at that time.  This can also be done in the outpatient setting.    Diagnosis:    ICD-10-CM    1. Shortness of breath  R06.02    2. Pulmonary air trapping  R09.89     Left apical lung (no pneumothorax)       Discharge Medications:  Discharge Medication List as of 6/5/2021  8:42 PM      START taking these medications    Details   albuterol (PROAIR HFA/PROVENTIL HFA/VENTOLIN HFA) 108 (90 Base) MCG/ACT inhaler Inhale 2 puffs into the lungs every 6 hours as needed for shortness of breath / dyspnea or wheezing, Disp-18 g, R-0, Local Print           Scribe Disclosure:  I, Jacki Potts, am serving as a scribe at 6:01 PM on 6/5/2021 to document services personally performed by Jewel Umana MD based on my observations and the provider's statements to me.            Jewel Umana MD  06/05/21 5024

## 2021-06-06 LAB — INTERPRETATION ECG - MUSE: NORMAL

## 2022-01-03 ENCOUNTER — OFFICE VISIT (OUTPATIENT)
Dept: FAMILY MEDICINE | Facility: CLINIC | Age: 32
End: 2022-01-03
Payer: COMMERCIAL

## 2022-01-03 VITALS
TEMPERATURE: 97.4 F | BODY MASS INDEX: 29.21 KG/M2 | SYSTOLIC BLOOD PRESSURE: 94 MMHG | DIASTOLIC BLOOD PRESSURE: 67 MMHG | WEIGHT: 181 LBS | OXYGEN SATURATION: 97 % | HEART RATE: 77 BPM

## 2022-01-03 DIAGNOSIS — H10.33 ACUTE BACTERIAL CONJUNCTIVITIS OF BOTH EYES: ICD-10-CM

## 2022-01-03 DIAGNOSIS — J45.20 MILD INTERMITTENT ASTHMA WITHOUT COMPLICATION: ICD-10-CM

## 2022-01-03 DIAGNOSIS — R05.9 COUGH: Primary | ICD-10-CM

## 2022-01-03 PROCEDURE — U0005 INFEC AGEN DETEC AMPLI PROBE: HCPCS | Performed by: FAMILY MEDICINE

## 2022-01-03 PROCEDURE — U0003 INFECTIOUS AGENT DETECTION BY NUCLEIC ACID (DNA OR RNA); SEVERE ACUTE RESPIRATORY SYNDROME CORONAVIRUS 2 (SARS-COV-2) (CORONAVIRUS DISEASE [COVID-19]), AMPLIFIED PROBE TECHNIQUE, MAKING USE OF HIGH THROUGHPUT TECHNOLOGIES AS DESCRIBED BY CMS-2020-01-R: HCPCS | Performed by: FAMILY MEDICINE

## 2022-01-03 PROCEDURE — 99204 OFFICE O/P NEW MOD 45 MIN: CPT

## 2022-01-03 RX ORDER — POLYMYXIN B SULFATE AND TRIMETHOPRIM 1; 10000 MG/ML; [USP'U]/ML
1-2 SOLUTION OPHTHALMIC 4 TIMES DAILY
Qty: 2 ML | Refills: 0 | Status: SHIPPED | OUTPATIENT
Start: 2022-01-03 | End: 2022-01-08

## 2022-01-03 RX ORDER — ALBUTEROL SULFATE 90 UG/1
2 AEROSOL, METERED RESPIRATORY (INHALATION) EVERY 4 HOURS PRN
Qty: 18 G | Refills: 0 | Status: SHIPPED | OUTPATIENT
Start: 2022-01-03 | End: 2022-02-02

## 2022-01-03 NOTE — PROGRESS NOTES
ASSESSMENT:    ICD-10-CM    1. Cough  R05.9 Symptomatic; Yes; 12/27/2021 COVID-19 Virus (Coronavirus) by PCR Nose     albuterol (PROAIR HFA/PROVENTIL HFA/VENTOLIN HFA) 108 (90 Base) MCG/ACT inhaler   2. Acute bacterial conjunctivitis of both eyes  H10.33 trimethoprim-polymyxin b (POLYTRIM) 42337-2.1 UNIT/ML-% ophthalmic solution   3. Mild intermittent asthma without complication  J45.20      Suspect COVID given symptoms and unvaccinated patient.    PLAN:  Patient Instructions   Covid PCR test pending.  Results should be done is 24-48 hours.  You'll get a call from the COVID results team if your test is positive.  You can check in [facility] for results as well.    Drink lots of fluids, rest as much as you can, use albuterol every 4 hours as needed.  If symptoms are rapidly and severely worsening, go to the ER.    For pinkeye, use Polytrim drops in both eyes 4 times per day for 5 days.      SUBJECTIVE:  Bev Strong is a 32 year old female in her early second trimester of pregnancy who presents to  today with chills and cough.  Symptoms have been present for a week.  Has felt feverish.  Shortness of breath increased today.  Has history of asthma and would like albuterol MDI refill.  Also noticed eyes getting very red.  Have been matted in the morning.    OBJECTIVE:  BP 94/67 (BP Location: Right arm, Patient Position: Chair, Cuff Size: Adult Regular)   Pulse 77   Temp 97.4  F (36.3  C) (Oral)   Wt 82.1 kg (181 lb)   SpO2 97%   BMI 29.21 kg/m    GEN: well-appearing, in NAD.  No labored breathing.  EYES:  PERRL, bilateral conjunctival injection, some mattering noted in lashed on both sides  ENT: TMs normal, oral MMM, pharynx not erythematous  Neck: no LAD  Lungs: CTAB

## 2022-01-03 NOTE — PATIENT INSTRUCTIONS
Covid PCR test pending.  Results should be done is 24-48 hours.  You'll get a call from the COVID results team if your test is positive.  [facility]    Drink lots of fluids, rest as much as you can, use albuterol every 4 hours as needed.  If symptoms are rapidly and severely worsening, go to the ER.    For pinkeye, use Polytrim drops in both eyes 4 times per day for 5 days.

## 2022-01-04 LAB — SARS-COV-2 RNA RESP QL NAA+PROBE: POSITIVE

## 2022-01-05 ENCOUNTER — TELEPHONE (OUTPATIENT)
Dept: FAMILY MEDICINE | Facility: CLINIC | Age: 32
End: 2022-01-05
Payer: COMMERCIAL

## 2022-01-05 NOTE — TELEPHONE ENCOUNTER
"Coronavirus (COVID-19) Notification    Caller Name (Patient, parent, daughter/son, grandparent, etc)  Patient    Reason for call  Notify of Positive Coronavirus (COVID-19) lab results, assess symptoms,  review  [employer] recommendations    Lab Result    Lab test:  2019-nCoV rRt-PCR or SARS-CoV-2 PCR    Oropharyngeal AND/OR nasopharyngeal swabs is POSITIVE for 2019-nCoV RNA/SARS-COV-2 PCR (COVID-19 virus)    RN Recommendations/Instructions per [facility] Coronavirus COVID-19 recommendations    Brief introduction script  Introduce self then review script:  \"I am calling on behalf of [employer].  We were notified that your Coronavirus test (COVID-19) for was POSITIVE for the virus.  I have some information to relay to you but first I wanted to mention that the [place] Dept of Health will be contacting you shortly [it's possible MD already called Patient] to talk to you more about how you are feeling and other people you have had contact with who might now also have the virus.  Also,  [employer] is Partnering with the [facility] for Covid-19 research, you may be contacted directly by research staff.\"    Assessment (Inquire about Patient's current symptoms)   Assessment   Current Symptoms at time of phone call: (if no symptoms, document No symptoms] No sense of taste or smell   Symptoms onset (if applicable) 12/27/21     If at time of call, Patients symptoms hare worsened, the Patient should contact 911 or have someone drive them to Emergency Dept promptly:      If Patient calling 911, inform 911 personal that you have tested positive for the Coronavirus (COVID-19).  Place mask on and await 911 to arrive.    If Emergency Dept, If possible, please have another adult drive you to the Emergency Dept but you need to wear mask when in contact with other people.      Monoclonal Antibody Administration    You may be eligible to receive a new treatment with a monoclonal antibody for preventing " hospitalization in patients at high risk for complications from COVID-19.   This medication is still experimental and available on a limited basis; it is given through an IV and must be given at an infusion center. Please note that not all people who are eligible will receive the medication since it is in limited supply.     Are you interested in being considered for this medication?  No.   Does the patient fit the criteria: No    Review information with Patient    Your result was positive. This means you have COVID-19 (coronavirus).  We have sent you a letter that reviews the information that I'll be reviewing with you now.    How can I protect others?    If you have symptoms: stay home and away from others (self-isolate) until:    You've had no fever--and no medicine that reduces fever--for 1 full day (24 hours). And       Your other symptoms have gotten better. For example, your cough or breathing has improved. And     At least 10 days have passed since your symptoms started. (If you've been told by a doctor that you have a weak immune system, wait 20 days.)     If you don't have symptoms: Stay home and away from others (self-isolate) until at least 10 days have passed since your first positive COVID-19 test. (Date test collected)    During this time:    Stay in your own room, including for meals. Use your own bathroom if you can.    Stay away from others in your home. No hugging, kissing or shaking hands. No visitors.     Don't go to work, school or anywhere else.     Clean  high touch  surfaces often (doorknobs, counters, handles, etc.). Use a household cleaning spray or wipes. You'll find a full list on the EPA website at www.epa.gov/pesticide-registration/list-n-disinfectants-use-against-sars-cov-2.     Cover your mouth and nose with a mask, tissue or other face covering to avoid spreading germs.    Wash your hands and face often with soap and water.    Make a list of people you have been in close contact  with recently, even if either of you wore a face covering.   - Start your list from 2 days before you became ill or had a positive test.  - Include anyone that was within 6 feet of you for a cumulative total of 15 minutes or more in 24 hours. (Example: if you sat next to Venu for 5 minutes in the morning and 10 minutes in the afternoon, then you were in close contact for 15 minutes total that day. Venu would be added to your list.)    A public health worker will call or text you. It is important that you answer. They will ask you questions about possible exposures to COVID-19, such as people you have been in direct contact with and places you have visited.    Tell the people on your list that you have COVID-19; they should stay away from others for 14 days starting from the last time they were in contact with you (unless you are told something different from a public health worker).     Caregivers in these groups are at risk for severe illness due to COVID-19:  o People 65 years and older  o People who live in a nursing home or long-term care facility  o People with chronic disease (lung, heart, cancer, diabetes, kidney, liver, immunologic)  o People who have a weakened immune system, including those who:  - Are in cancer treatment  - Take medicine that weakens the immune system, such as corticosteroids  - Had a bone marrow or organ transplant  - Have an immune deficiency  - Have poorly controlled HIV or AIDS  - Are obese (body mass index of 40 or higher)  - Smoke regularly    Caregivers should wear gloves while washing dishes, handling laundry and cleaning bedrooms and bathrooms.    Wash and dry laundry with special caution. Don't shake dirty laundry, and use the warmest water setting you can.    If you have a weakened immune system, ask your doctor about other actions you should take.    For more tips, go to www.cdc.gov/coronavirus/2019-ncov/downloads/10Things.pdf.    You should not go back to work until you meet  the guidelines above for ending your home isolation. You don't need to be retested for COVID-19 before going back to work--studies show that you won't spread the virus if it's been at least 10 days since your symptoms started (or 20 days, if you have a weak immune system).    Employers: This document serves as formal notice of your employee's medical guidelines for going back to work. They must meet the above guidelines before going back to work in person.    How can I take care of myself?    1. Get lots of rest. Drink extra fluids (unless a doctor has told you not to).    2. Take Tylenol (acetaminophen) for fever or pain. If you have liver or kidney problems, ask your family doctor if it's okay to take Tylenol.     Take either:     650 mg (two 325 mg pills) every 4 to 6 hours, or     1,000 mg (two 500 mg pills) every 8 hours as needed.     Note: Don't take more than 3,000 mg in one day. Acetaminophen is found in many medicines (both prescribed and over-the-counter medicines). Read all labels to be sure you don't take too much.    For children, check the Tylenol bottle for the right dose (based on their age or weight).    3. If you have other health problems (like cancer, heart failure, an organ transplant or severe kidney disease): Call your specialty clinic if you don't feel better in the next 2 days.    4. Know when to call 911: Emergency warning signs include:    Trouble breathing or shortness of breath    Pain or pressure in the chest that doesn't go away    Feeling confused like you haven't felt before, or not being able to wake up    Bluish-colored lips or face    5. Sign up for [employer]. We know it's scary to hear that you have COVID-19. We want to track your symptoms to make sure you're okay over the next 2 weeks. Please look for an email from [employer]--this is a free, online program that we'll use to keep in touch. To sign up, follow the link in the email. Learn more at  [email or web address].    Where can I get more information?    [facility]: [email or web address]    Coronavirus Basics: www.health.[facility].[place]/diseases/coronavirus/basics.html    What to Do If You're Sick: www.cdc.gov/coronavirus/2019-ncov/about/steps-when-sick.html    Ending Home Isolation: www.cdc.gov/coronavirus/2019-ncov/hcp/disposition-in-home-patients.html     Caring for Someone with COVID-19: www.cdc.gov/coronavirus/2019-ncov/if-you-are-sick/care-for-someone.html     [facility] clinical trials (COVID-19 research studies): clinicalaffairs.[facility]/n-clinical-trials     A Positive COVID-19 letter will be sent via [facility] or the mail. (Exception, no letters sent to Presurgerical/Preprocedure Patients)    Elba Zuluaga LPN

## 2022-01-31 ENCOUNTER — HOSPITAL ENCOUNTER (EMERGENCY)
Facility: CLINIC | Age: 32
Discharge: HOME | End: 2022-01-31
Attending: EMERGENCY MEDICINE | Admitting: EMERGENCY MEDICINE
Payer: COMMERCIAL

## 2022-01-31 ENCOUNTER — HOSPITAL ENCOUNTER (EMERGENCY)
Facility: CLINIC | Age: 32
End: 2022-01-31
Payer: COMMERCIAL

## 2022-01-31 VITALS
HEART RATE: 89 BPM | OXYGEN SATURATION: 100 % | SYSTOLIC BLOOD PRESSURE: 109 MMHG | DIASTOLIC BLOOD PRESSURE: 62 MMHG | TEMPERATURE: 98.3 F | HEIGHT: 66 IN | RESPIRATION RATE: 20 BRPM | BODY MASS INDEX: 30.22 KG/M2 | WEIGHT: 188 LBS

## 2022-01-31 DIAGNOSIS — L53.9 REDNESS OF SKIN: ICD-10-CM

## 2022-01-31 PROCEDURE — 99282 EMERGENCY DEPT VISIT SF MDM: CPT

## 2022-01-31 ASSESSMENT — ENCOUNTER SYMPTOMS
CHILLS: 0
FEVER: 0

## 2022-01-31 ASSESSMENT — MIFFLIN-ST. JEOR: SCORE: 1569.51

## 2022-01-31 NOTE — DISCHARGE INSTRUCTIONS
I recommend trying Neosporin or any over-the-counter topical antibiotic ointment.  You should keep an eye on this reddened area should rapidly spread in size, become tender or you notice firmness to it you should be reevaluated.

## 2022-01-31 NOTE — ED PROVIDER NOTES
"  History   Chief Complaint:  Skin Check       The history is provided by the patient.      Bev Strong is a 15 week pregnant otherwise healthy 32 year old female who presents for a skin check. The patient reports that she noticed a non-painful red spot on her right breast when getting out of the shower that concerned her. Se denies any fever or chills. She denies having any medical problems and daily medications other than daily vitamins.      Review of Systems   Constitutional: Negative for chills and fever.   All other systems reviewed and are negative.      Allergies:  Contrast dye.    Medications:  The patient is currently on no regular medications.    Past Medical History:     The patient denies any significant past medical history.    Social History:  Presents unaccompanied  PCP: Medicine, [facility]    Physical Exam     Patient Vitals for the past 24 hrs:   BP Temp Temp src Pulse Resp SpO2 Height Weight   01/31/22 1451 109/62 98.3  F (36.8  C) Temporal 89 20 100 % 1.676 m (5' 6\") 85.3 kg (188 lb)       Physical Exam  Constitutional: Alert, attentive, GCS 15   Eyes: EOM are normal, anicteric, conjugate gaze  CV: distal extremities warm, well perfused  Chest: Non-labored breathing on RA  Neurological: Alert, attentive, moving all extremities equally.   Skin:<2mm circular, blanching red macule ion mid-right breast.  No lumps or tenderness      Emergency Department Course     Reviewed:  I reviewed nursing notes, vitals, past medical history and Care Everywhere    Assessments:  1552 I obtained history and examined the patient as noted above.   1552 I rechecked the patient and explained findings.     Disposition:  The patient was discharged to home.     Impression & Plan     Medical Decision Making:  Previously healthy 32-year-old at 15 weeks pregnant presenting for red spot she noticed on her right breast today.  She denies any pain or tenderness, no trauma.  On exam she has a small red macule that " is blanching.  Question of possible infectious etiology, though no indication for oral antibiotics.  I recommended over-the-counter Neosporin or other antibiotic ointment and continuing to monitor this red spot.  Low suspicion for mastitis, or neoplasm at this time.  I also recommended recheck with her PCP.    Diagnosis:    ICD-10-CM    1. Redness of skin  L53.9      Venu Tan MD  [employer]  4:07 PM 01/31/22     Scribe Disclosure:  I, Jessica Herrera, am serving as a scribe at 3:39 PM on 1/31/2022 to document services personally performed by Venu Tan MD based on my observations and the provider's statements to me.             Venu Tan MD  01/31/22 1536

## 2022-01-31 NOTE — ED TRIAGE NOTES
Pt noticed a small raised area on right breast after shower this morning and concerned its cancer.

## 2022-02-16 ENCOUNTER — HOSPITAL ENCOUNTER (EMERGENCY)
Facility: CLINIC | Age: 32
Discharge: HOME | End: 2022-02-17
Admitting: EMERGENCY MEDICINE
Payer: COMMERCIAL

## 2022-02-16 VITALS
TEMPERATURE: 98 F | WEIGHT: 184 LBS | OXYGEN SATURATION: 100 % | RESPIRATION RATE: 20 BRPM | SYSTOLIC BLOOD PRESSURE: 132 MMHG | BODY MASS INDEX: 29.57 KG/M2 | HEIGHT: 66 IN | HEART RATE: 107 BPM | DIASTOLIC BLOOD PRESSURE: 74 MMHG

## 2022-02-16 LAB
ALBUMIN SERPL-MCNC: 3.4 G/DL (ref 3.4–5)
ALP SERPL-CCNC: 71 U/L (ref 40–150)
ALT SERPL W P-5'-P-CCNC: 22 U/L (ref 0–50)
ANION GAP SERPL CALCULATED.3IONS-SCNC: 7 MMOL/L (ref 3–14)
AST SERPL W P-5'-P-CCNC: 9 U/L (ref 0–45)
BASOPHILS # BLD AUTO: 0.1 10E3/UL (ref 0–0.2)
BASOPHILS NFR BLD AUTO: 1 %
BILIRUB SERPL-MCNC: 0.2 MG/DL (ref 0.2–1.3)
BUN SERPL-MCNC: 8 MG/DL (ref 7–30)
CALCIUM SERPL-MCNC: 9 MG/DL (ref 8.5–10.1)
CHLORIDE BLD-SCNC: 102 MMOL/L (ref 94–109)
CO2 SERPL-SCNC: 24 MMOL/L (ref 20–32)
CREAT SERPL-MCNC: 0.39 MG/DL (ref 0.52–1.04)
EOSINOPHIL # BLD AUTO: 0.2 10E3/UL (ref 0–0.7)
EOSINOPHIL NFR BLD AUTO: 1 %
ERYTHROCYTE [DISTWIDTH] IN BLOOD BY AUTOMATED COUNT: 14.1 % (ref 10–15)
GFR SERPL CREATININE-BSD FRML MDRD: >90 ML/MIN/1.73M2
GLUCOSE BLD-MCNC: 97 MG/DL (ref 70–99)
HCT VFR BLD AUTO: 38.1 % (ref 35–47)
HGB BLD-MCNC: 12.6 G/DL (ref 11.7–15.7)
HOLD SPECIMEN: NORMAL
IMM GRANULOCYTES # BLD: 0.2 10E3/UL
IMM GRANULOCYTES NFR BLD: 1 %
LYMPHOCYTES # BLD AUTO: 3.3 10E3/UL (ref 0.8–5.3)
LYMPHOCYTES NFR BLD AUTO: 21 %
MCH RBC QN AUTO: 30.7 PG (ref 26.5–33)
MCHC RBC AUTO-ENTMCNC: 33.1 G/DL (ref 31.5–36.5)
MCV RBC AUTO: 93 FL (ref 78–100)
MONOCYTES # BLD AUTO: 1.6 10E3/UL (ref 0–1.3)
MONOCYTES NFR BLD AUTO: 10 %
NEUTROPHILS # BLD AUTO: 10.1 10E3/UL (ref 1.6–8.3)
NEUTROPHILS NFR BLD AUTO: 66 %
NRBC # BLD AUTO: 0 10E3/UL
NRBC BLD AUTO-RTO: 0 /100
PLATELET # BLD AUTO: 259 10E3/UL (ref 150–450)
POTASSIUM BLD-SCNC: 3.1 MMOL/L (ref 3.4–5.3)
PROT SERPL-MCNC: 7.4 G/DL (ref 6.8–8.8)
RBC # BLD AUTO: 4.11 10E6/UL (ref 3.8–5.2)
SODIUM SERPL-SCNC: 133 MMOL/L (ref 133–144)
TROPONIN I SERPL HS-MCNC: 4 NG/L
WBC # BLD AUTO: 15.4 10E3/UL (ref 4–11)

## 2022-02-16 PROCEDURE — 36415 COLL VENOUS BLD VENIPUNCTURE: CPT | Performed by: EMERGENCY MEDICINE

## 2022-02-16 PROCEDURE — 82040 ASSAY OF SERUM ALBUMIN: CPT | Performed by: EMERGENCY MEDICINE

## 2022-02-16 PROCEDURE — 85025 COMPLETE CBC W/AUTO DIFF WBC: CPT | Performed by: EMERGENCY MEDICINE

## 2022-02-16 PROCEDURE — 80053 COMPREHEN METABOLIC PANEL: CPT | Performed by: EMERGENCY MEDICINE

## 2022-02-16 PROCEDURE — 999N000104 HC STATISTIC NO CHARGE

## 2022-02-16 PROCEDURE — 93005 ELECTROCARDIOGRAM TRACING: CPT

## 2022-02-16 PROCEDURE — 84484 ASSAY OF TROPONIN QUANT: CPT | Performed by: EMERGENCY MEDICINE

## 2022-02-17 ENCOUNTER — HOSPITAL ENCOUNTER (EMERGENCY)
Facility: CLINIC | Age: 32
Discharge: HOME | End: 2022-02-17
Attending: EMERGENCY MEDICINE | Admitting: EMERGENCY MEDICINE
Payer: COMMERCIAL

## 2022-02-17 VITALS
HEART RATE: 122 BPM | DIASTOLIC BLOOD PRESSURE: 73 MMHG | TEMPERATURE: 98 F | SYSTOLIC BLOOD PRESSURE: 126 MMHG | OXYGEN SATURATION: 100 % | RESPIRATION RATE: 20 BRPM

## 2022-02-17 DIAGNOSIS — R00.2 PALPITATIONS: ICD-10-CM

## 2022-02-17 LAB
ALBUMIN UR-MCNC: NEGATIVE MG/DL
ANION GAP SERPL CALCULATED.3IONS-SCNC: 9 MMOL/L (ref 3–14)
APPEARANCE UR: CLEAR
ATRIAL RATE - MUSE: 111 BPM
BASOPHILS # BLD AUTO: 0 10E3/UL (ref 0–0.2)
BASOPHILS NFR BLD AUTO: 0 %
BILIRUB UR QL STRIP: NEGATIVE
BUN SERPL-MCNC: 6 MG/DL (ref 7–30)
CALCIUM SERPL-MCNC: 9.2 MG/DL (ref 8.5–10.1)
CHLORIDE BLD-SCNC: 105 MMOL/L (ref 94–109)
CO2 SERPL-SCNC: 23 MMOL/L (ref 20–32)
COLOR UR AUTO: ABNORMAL
CREAT SERPL-MCNC: 0.4 MG/DL (ref 0.52–1.04)
D DIMER PPP FEU-MCNC: 0.46 UG/ML FEU (ref 0–0.5)
DIASTOLIC BLOOD PRESSURE - MUSE: NORMAL MMHG
EOSINOPHIL # BLD AUTO: 0.2 10E3/UL (ref 0–0.7)
EOSINOPHIL NFR BLD AUTO: 1 %
ERYTHROCYTE [DISTWIDTH] IN BLOOD BY AUTOMATED COUNT: 14.2 % (ref 10–15)
GFR SERPL CREATININE-BSD FRML MDRD: >90 ML/MIN/1.73M2
GLUCOSE BLD-MCNC: 98 MG/DL (ref 70–99)
GLUCOSE UR STRIP-MCNC: NEGATIVE MG/DL
HCT VFR BLD AUTO: 38.8 % (ref 35–47)
HGB BLD-MCNC: 13 G/DL (ref 11.7–15.7)
HGB UR QL STRIP: NEGATIVE
IMM GRANULOCYTES # BLD: 0.2 10E3/UL
IMM GRANULOCYTES NFR BLD: 1 %
INTERPRETATION ECG - MUSE: NORMAL
KETONES UR STRIP-MCNC: NEGATIVE MG/DL
LEUKOCYTE ESTERASE UR QL STRIP: NEGATIVE
LYMPHOCYTES # BLD AUTO: 2.6 10E3/UL (ref 0.8–5.3)
LYMPHOCYTES NFR BLD AUTO: 18 %
MCH RBC QN AUTO: 31.2 PG (ref 26.5–33)
MCHC RBC AUTO-ENTMCNC: 33.5 G/DL (ref 31.5–36.5)
MCV RBC AUTO: 93 FL (ref 78–100)
MONOCYTES # BLD AUTO: 1.4 10E3/UL (ref 0–1.3)
MONOCYTES NFR BLD AUTO: 10 %
MUCOUS THREADS #/AREA URNS LPF: PRESENT /LPF
NEUTROPHILS # BLD AUTO: 10.4 10E3/UL (ref 1.6–8.3)
NEUTROPHILS NFR BLD AUTO: 70 %
NITRATE UR QL: NEGATIVE
NRBC # BLD AUTO: 0 10E3/UL
NRBC BLD AUTO-RTO: 0 /100
P AXIS - MUSE: 46 DEGREES
PH UR STRIP: 6.5 [PH] (ref 5–7)
PLATELET # BLD AUTO: 240 10E3/UL (ref 150–450)
POTASSIUM BLD-SCNC: 3.3 MMOL/L (ref 3.4–5.3)
PR INTERVAL - MUSE: 130 MS
QRS DURATION - MUSE: 72 MS
QT - MUSE: 318 MS
QTC - MUSE: 432 MS
R AXIS - MUSE: 60 DEGREES
RBC # BLD AUTO: 4.17 10E6/UL (ref 3.8–5.2)
RBC URINE: <1 /HPF
SODIUM SERPL-SCNC: 137 MMOL/L (ref 133–144)
SP GR UR STRIP: 1.01 (ref 1–1.03)
SQUAMOUS EPITHELIAL: <1 /HPF
SYSTOLIC BLOOD PRESSURE - MUSE: NORMAL MMHG
T AXIS - MUSE: 23 DEGREES
TROPONIN I SERPL HS-MCNC: <3 NG/L
UROBILINOGEN UR STRIP-MCNC: NORMAL MG/DL
VENTRICULAR RATE- MUSE: 111 BPM
WBC # BLD AUTO: 14.7 10E3/UL (ref 4–11)
WBC URINE: <1 /HPF

## 2022-02-17 PROCEDURE — 36415 COLL VENOUS BLD VENIPUNCTURE: CPT | Performed by: EMERGENCY MEDICINE

## 2022-02-17 PROCEDURE — 85025 COMPLETE CBC W/AUTO DIFF WBC: CPT | Performed by: EMERGENCY MEDICINE

## 2022-02-17 PROCEDURE — 80048 BASIC METABOLIC PNL TOTAL CA: CPT | Performed by: EMERGENCY MEDICINE

## 2022-02-17 PROCEDURE — 93005 ELECTROCARDIOGRAM TRACING: CPT

## 2022-02-17 PROCEDURE — 81001 URINALYSIS AUTO W/SCOPE: CPT | Performed by: EMERGENCY MEDICINE

## 2022-02-17 PROCEDURE — 84484 ASSAY OF TROPONIN QUANT: CPT | Performed by: EMERGENCY MEDICINE

## 2022-02-17 PROCEDURE — 99284 EMERGENCY DEPT VISIT MOD MDM: CPT

## 2022-02-17 PROCEDURE — 85379 FIBRIN DEGRADATION QUANT: CPT | Performed by: EMERGENCY MEDICINE

## 2022-02-17 ASSESSMENT — ENCOUNTER SYMPTOMS
ABDOMINAL PAIN: 0
COUGH: 0
PALPITATIONS: 1
DIARRHEA: 0
DIZZINESS: 0
DYSURIA: 0
CHILLS: 0
FEVER: 0
SHORTNESS OF BREATH: 0
WEAKNESS: 0
VOMITING: 0
UNEXPECTED WEIGHT CHANGE: 0
DIAPHORESIS: 0
HEMATURIA: 0
NUMBNESS: 0
CHEST TIGHTNESS: 0
FLANK PAIN: 0
NAUSEA: 0
NECK STIFFNESS: 0
FATIGUE: 0
FACIAL ASYMMETRY: 0
NECK PAIN: 0
HEADACHES: 0

## 2022-02-17 NOTE — ED TRIAGE NOTES
Pt presents from urgent care. States she has been having palpitations and tachycardia for 3 days. Pt is 18 weeks pregnant.

## 2022-02-18 LAB
ATRIAL RATE - MUSE: 108 BPM
DIASTOLIC BLOOD PRESSURE - MUSE: NORMAL MMHG
INTERPRETATION ECG - MUSE: NORMAL
P AXIS - MUSE: 46 DEGREES
PR INTERVAL - MUSE: 122 MS
QRS DURATION - MUSE: 72 MS
QT - MUSE: 322 MS
QTC - MUSE: 431 MS
R AXIS - MUSE: 67 DEGREES
SYSTOLIC BLOOD PRESSURE - MUSE: NORMAL MMHG
T AXIS - MUSE: 18 DEGREES
VENTRICULAR RATE- MUSE: 108 BPM

## 2022-02-18 NOTE — ED PROVIDER NOTES
History     Chief Complaint:  Chest Pain and Palpitations       HPI   Bev Strong is a 32 year old female who presents with palpitation and pulse feeling tried to be seen last night at [facility] but WR was busy and has paperwork with blood work only.  Denies any tearing or severe neck or headache.  Worse after eating and lying flat.  She states a history of clots in [place] but cannot provide further details.  2 miscarriages 3rd pregnancy.  No VB VD CTX LOF.  No NVD.  No severe chest pain or SOB.      ROS:  Review of Systems   Constitutional: Negative for chills, diaphoresis, fatigue, fever and unexpected weight change.   Eyes: Negative for visual disturbance.   Respiratory: Negative for cough, chest tightness and shortness of breath.    Cardiovascular: Positive for chest pain and palpitations. Negative for leg swelling.   Gastrointestinal: Negative for abdominal pain, diarrhea, nausea and vomiting.   Genitourinary: Negative for dysuria, flank pain, hematuria, vaginal bleeding, vaginal discharge and vaginal pain.   Musculoskeletal: Negative for neck pain and neck stiffness.   Neurological: Negative for dizziness, syncope, facial asymmetry, weakness, numbness and headaches.   All other systems reviewed and are negative.         Allergies:  Contrast Dye     Medications:    Acetaminophen (TYLENOL PO)  albuterol (PROAIR HFA/PROVENTIL HFA/VENTOLIN HFA) 108 (90 Base) MCG/ACT inhaler  albuterol (PROAIR HFA/PROVENTIL HFA/VENTOLIN HFA) 108 (90 Base) MCG/ACT inhaler  metFORMIN (GLUCOPHAGE) 500 MG tablet  Prenatal Vit-Fe Fumarate-FA (PRENATAL VITAMIN PLUS LOW IRON) 27-1 MG TABS        Past Medical History:    Past Medical History:   Diagnosis Date     Tuberculosis      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     Mild intermittent asthma without complication        Past Surgical History:    Past Surgical History:   Procedure Laterality Date     DILATION AND CURETTAGE   "03/2020     MT INFIBULATION TAKEDOWN  01/2020    done in [facility] HYSTEROSONOGRAPHY  08/2020      HYSTEROSONOGRAPHY  10/2020    \"flushed tubes\" in [facility]        Family History:    family history includes Hypertension in her father and maternal grandfather; No Known Problems in her brother, maternal grandmother, mother, paternal grandmother, sister, and another family member.    Social History:   reports that she has never smoked. She has never used smokeless tobacco. She reports that she does not drink alcohol and does not use drugs.  PCP: Medicine, [facility]     Physical Exam     Patient Vitals for the past 24 hrs:   BP Temp Temp src Pulse Resp SpO2   02/17/22 2115 126/73 -- -- -- -- 100 %   02/17/22 2019 (!) 146/90 98  F (36.7  C) Oral (!) 122 20 100 %        Physical Exam  Constitutional: Patient is well appearing. No distress.  Head: Atraumatic.  Eyes: Conjunctivae and EOM are normal. No scleral icterus.  Neck: Normal range of motion. Neck supple.  No bruit.  No pulsatile mass.  Cardiovascular: Normal rate, regular rhythm, normal heart sounds and intact distal pulses.   Pulmonary/Chest: Breath sounds normal. No respiratory distress.  Abdominal: Soft. Bowel sounds are normal. No distension. No tenderness. No rebound or guarding.  Gravid.  Musculoskeletal: Normal range of motion. No edema or tenderness.   Neurological: Alert and orientated to person, place, and time. No observable focal neuro deficit  Skin: Warm and dry. No rash noted. Not diaphoretic.     Emergency Department Course   ECG:      Imaging:  No orders to display      Report per radiology    Laboratory:  Labs Ordered and Resulted from Time of ED Arrival to Time of ED Departure   BASIC METABOLIC PANEL - Abnormal       Result Value    Sodium 137      Potassium 3.3 (*)     Chloride 105      Carbon Dioxide (CO2) 23      Anion Gap 9      Urea Nitrogen 6 (*)     Creatinine 0.40 (*)     Calcium 9.2      Glucose 98      GFR Estimate >90   "   ROUTINE UA WITH MICROSCOPIC REFLEX TO CULTURE - Abnormal    Color Urine Straw      Appearance Urine Clear      Glucose Urine Negative      Bilirubin Urine Negative      Ketones Urine Negative      Specific Gravity Urine 1.007      Blood Urine Negative      pH Urine 6.5      Protein Albumin Urine Negative      Urobilinogen Urine Normal      Nitrite Urine Negative      Leukocyte Esterase Urine Negative      Mucus Urine Present (*)     RBC Urine <1      WBC Urine <1      Squamous Epithelials Urine <1     CBC WITH PLATELETS AND DIFFERENTIAL - Abnormal    WBC Count 14.7 (*)     RBC Count 4.17      Hemoglobin 13.0      Hematocrit 38.8      MCV 93      MCH 31.2      MCHC 33.5      RDW 14.2      Platelet Count 240      % Neutrophils 70      % Lymphocytes 18      % Monocytes 10      % Eosinophils 1      % Basophils 0      % Immature Granulocytes 1      NRBCs per 100 WBC 0      Absolute Neutrophils 10.4 (*)     Absolute Lymphocytes 2.6      Absolute Monocytes 1.4 (*)     Absolute Eosinophils 0.2      Absolute Basophils 0.0      Absolute Immature Granulocytes 0.2      Absolute NRBCs 0.0     TROPONIN I - Normal    Troponin I High Sensitivity <3     D DIMER QUANTITATIVE - Normal    D-Dimer Quantitative 0.46          Procedures       Emergency Department Course:             Reviewed:  I reviewed nursing notes, vitals and past medical history    Assessments:   I obtained history and examined the patient as noted above.    I rechecked the patient and explained findings.       Consults:       Interventions:  Medications - No data to display     Disposition:  The patient was discharged to home.     Impression & Plan        Covid-19  Bev Strong was evaluated during a global COVID-19 pandemic, which necessitated consideration that the patient might be at risk for infection with the SARS-CoV-2 virus that causes COVID-19.   Applicable protocols for evaluation were followed during the patient's care.   COVID-19 was considered as  part of the patient's evaluation.     Medical Decision Making:  Pt is pregnant without signs of carotid abnl.  Also considering CP and preg and palpitations.  Ddimer neg.  Not hypoxic.  Tachy and elev WBC may be of pregnancy.  Also trop and EKG very reassuring.  Exam normal and pt very reassured at this time rec close follow up and strict return instructions given.    Diagnosis:    ICD-10-CM    1. Palpitations  R00.2         Discharge Medications:  Discharge Medication List as of 2/17/2022  9:49 PM           2/17/2022   Damon Orona MD Stevens, Andrew C, MD  02/18/22 0036

## 2022-02-22 ENCOUNTER — HOSPITAL ENCOUNTER (OUTPATIENT)
Facility: CLINIC | Age: 32
End: 2022-02-22
Admitting: OBSTETRICS & GYNECOLOGY
Payer: COMMERCIAL

## 2022-02-22 ENCOUNTER — HOSPITAL ENCOUNTER (OUTPATIENT)
Facility: CLINIC | Age: 32
Discharge: HOME | End: 2022-02-22
Attending: OBSTETRICS & GYNECOLOGY | Admitting: OBSTETRICS & GYNECOLOGY
Payer: COMMERCIAL

## 2022-02-22 ENCOUNTER — APPOINTMENT (OUTPATIENT)
Dept: ULTRASOUND IMAGING | Facility: CLINIC | Age: 32
End: 2022-02-22
Attending: OBSTETRICS & GYNECOLOGY
Payer: COMMERCIAL

## 2022-02-22 ENCOUNTER — HOSPITAL ENCOUNTER (EMERGENCY)
Facility: CLINIC | Age: 32
Discharge: LEFT BEFORE DISCHARGE | End: 2022-02-22
Admitting: EMERGENCY MEDICINE
Payer: COMMERCIAL

## 2022-02-22 VITALS
BODY MASS INDEX: 30.57 KG/M2 | DIASTOLIC BLOOD PRESSURE: 67 MMHG | HEART RATE: 105 BPM | TEMPERATURE: 98.6 F | OXYGEN SATURATION: 99 % | HEIGHT: 66 IN | RESPIRATION RATE: 18 BRPM | SYSTOLIC BLOOD PRESSURE: 114 MMHG | WEIGHT: 190.2 LBS

## 2022-02-22 VITALS
DIASTOLIC BLOOD PRESSURE: 73 MMHG | OXYGEN SATURATION: 99 % | RESPIRATION RATE: 16 BRPM | TEMPERATURE: 98.5 F | SYSTOLIC BLOOD PRESSURE: 120 MMHG

## 2022-02-22 PROBLEM — Z36.89 ENCOUNTER FOR TRIAGE IN PREGNANT PATIENT: Status: ACTIVE | Noted: 2022-02-22

## 2022-02-22 PROCEDURE — 250N000013 HC RX MED GY IP 250 OP 250 PS 637: Performed by: EMERGENCY MEDICINE

## 2022-02-22 PROCEDURE — G0463 HOSPITAL OUTPT CLINIC VISIT: HCPCS

## 2022-02-22 PROCEDURE — 999N000104 HC STATISTIC NO CHARGE

## 2022-02-22 PROCEDURE — 76805 OB US >/= 14 WKS SNGL FETUS: CPT

## 2022-02-22 RX ORDER — ACETAMINOPHEN 325 MG/1
650 TABLET ORAL ONCE
Status: COMPLETED | OUTPATIENT
Start: 2022-02-22 | End: 2022-02-22

## 2022-02-22 RX ORDER — ASPIRIN 81 MG/1
81 TABLET, CHEWABLE ORAL DAILY
COMMUNITY

## 2022-02-22 RX ADMIN — ACETAMINOPHEN 650 MG: 325 TABLET, FILM COATED ORAL at 19:24

## 2022-02-23 NOTE — PLAN OF CARE
Patient discharge ambulatory accompanied by self. Patient took uber here and is going to call uber to get home. Patient understands discharge instructions and states she will not have intercourse until next ultrasound. Patient agrees with plan of care. Patient is declining to go to the ER to be evaluated for her pain, just wants to go home now that baby has been evaluated. Patient reports that if her pain gets worse she will follow up for her tailbone pain.

## 2022-02-23 NOTE — PROVIDER NOTIFICATION
02/22/22 2026   Provider Notification   Provider Name/Title Dr Mukherjee    Method of Notification Phone   Request Evaluate - Remote   Notification Reason Pain;Status Update       Update that patient is requesting ultrasound because she has abdominal pain after explaining to her s/s to look for at discharge. Rating pain 4/10. Abdomen soft, no bleeding, assessment/palpating abdomen do not make pain worse. Patient reports nausea, and threw up twice prior to arriving to [facility]. Once again denies hitting head or abdomen.      Order received to perform ultrasound to look for placenta placement, fluid, and growth.

## 2022-02-23 NOTE — DISCHARGE INSTRUCTIONS
Discharge Instruction for Undelivered Patients      You were seen for: Fall  We Consulted: Dr Mukherjee  You had (Test or Medicine):ultrasound to check fluid, placenta, and growth. Doppler heart rate of baby     Diet:   You may eat meals and snacks.     Activity:  Dr Mukherjee recommends not having intercourse until your next ultrasound for low lying placenta.      Call your provider if you notice:  Swelling in your face or increased swelling in your hands or legs.  Headaches that are not relieved by Tylenol (acetaminophen).  Changes in your vision (blurring: seeing spots or stars.)  Nausea (sick to your stomach) and vomiting (throwing up).   Weight gain of 5 pounds or more per week.  Heartburn that doesn't go away.  Signs of bladder infection: pain when you urinate (use the toilet), need to go more often and more urgently.  The bag of cristina (rupture of membranes) breaks, or you notice leaking in your underwear.  Bright red blood in your underwear.  Abdominal (lower belly) or stomach pain.  For first baby: Contractions (tightening) less than 5 minutes apart for one hour or more.  Second (plus) baby: Contractions (tightening) less than 10 minutes apart and getting stronger.  *If less than 34 weeks: Contractions (tightening) more than 6 times in one hour.  Increase or change in vaginal discharge (note the color and amount)  Other: increased pain or vaginal bleeding, call your Dr.     Follow-up:  As scheduled in the clinic

## 2022-02-23 NOTE — PROVIDER NOTIFICATION
02/22/22 2110   Provider Notification   Provider Name/Title Dr Mukherjee   Method of Notification Phone   Request Evaluate - Remote   Notification Reason Status Update       Update that patient is back from ultrasound. Do not have the official radiologist report, but did have the ultrasound tech report. Reviewed everything on ultrasound report with provider. Placenta is 0.8 cm from Cervix.     Okay for patient to discharge home, follow up as normal with provider. Did recommend waiting to have intercourse until next ultrasound to make sure the low lying placenta has resolved. If increased pain/vaginal bleeding will need to call her doctor or come back to be evaluated.

## 2022-02-23 NOTE — PLAN OF CARE
RN at bedside explaining to patient about discussion had with Dr. Mukherjee. Explained to look for vaginal bleeding and abdominal pain. Patient then said, well like I told the ER I have cramping and abdominal pain all along my lower abdomen. Patient rates pain 4/10. Patient also states she has thrown up twice since falling and has nausea. RN palpating abdomen, soft. Patient states pain does not get worse with me feeling/assessing belly.

## 2022-02-23 NOTE — PLAN OF CARE
" 18 week [place] [facility] per services of Dr Mukherjee (unassigned) for evaluation of fall.  Pt states she fell at 1830 getting into an uber car. She fell on her buttock and right elbow. Denies hitting head, denies hitting abdomin Patient reports being very nervous because she has had two miscarriages. Last OB appointment was  at [facility]. Patient is having tailbone pain /10 and a \"twitch\" in her vagina. No vaginal bleeding, no fluid noted. Patient has only felt baby one time so far, so she is not not feeling movement yet.  Discussed plan of care including TOCO, routine VS.  Pt agreeable.  EFM applied and admission assessment completed.      "

## 2022-02-23 NOTE — PROVIDER NOTIFICATION
02/22/22 2012   Provider Notification   Provider Name/Title Dr Mukherjee    Method of Notification Phone   Request Evaluate - Remote   Notification Reason Patient Arrived;Status Update     Update that patient arrived after fall. Doppler tone of baby: HR 160s. No vaginal bleeding noted. Patient complaining of tail bone pain. TOCO on, nothing picked up. Last OB appt was 2/14, but now needs to change clinics for insurance reasons. Patient is very nervous about her baby because she has has two miscarriages.     No new orders received, okay to discharge patient home and educate about signs of abruption including: abdominal pain or vaginal bleeding.       Dr Mukherjee looked up blood type of patient and reports O+ blood type.

## 2022-10-17 ENCOUNTER — HOSPITAL ENCOUNTER (EMERGENCY)
Facility: CLINIC | Age: 32
Discharge: HOME | End: 2022-10-17
Attending: EMERGENCY MEDICINE | Admitting: EMERGENCY MEDICINE
Payer: COMMERCIAL

## 2022-10-17 VITALS
RESPIRATION RATE: 16 BRPM | HEART RATE: 84 BPM | TEMPERATURE: 98.3 F | DIASTOLIC BLOOD PRESSURE: 73 MMHG | OXYGEN SATURATION: 97 % | SYSTOLIC BLOOD PRESSURE: 106 MMHG

## 2022-10-17 DIAGNOSIS — B02.9 HERPES ZOSTER WITHOUT COMPLICATION: ICD-10-CM

## 2022-10-17 DIAGNOSIS — B02.29 HZV (HERPES ZOSTER VIRUS) POST HERPETIC NEURALGIA: ICD-10-CM

## 2022-10-17 PROCEDURE — 99284 EMERGENCY DEPT VISIT MOD MDM: CPT | Performed by: EMERGENCY MEDICINE

## 2022-10-17 RX ORDER — IBUPROFEN 600 MG/1
600 TABLET, FILM COATED ORAL EVERY 8 HOURS PRN
Qty: 30 TABLET | Refills: 0 | Status: SHIPPED | OUTPATIENT
Start: 2022-10-17 | End: 2023-10-24

## 2022-10-17 RX ORDER — PREDNISONE 10 MG/1
TABLET ORAL
Qty: 32 TABLET | Refills: 0 | Status: SHIPPED | OUTPATIENT
Start: 2022-10-17 | End: 2022-10-27

## 2022-10-17 RX ORDER — HYDROCODONE BITARTRATE AND ACETAMINOPHEN 5; 325 MG/1; MG/1
1 TABLET ORAL EVERY 6 HOURS PRN
Qty: 10 TABLET | Refills: 0 | Status: SHIPPED | OUTPATIENT
Start: 2022-10-17 | End: 2022-10-20

## 2022-10-17 RX ORDER — GABAPENTIN 300 MG/1
300 CAPSULE ORAL 3 TIMES DAILY
Qty: 42 CAPSULE | Refills: 0 | Status: SHIPPED | OUTPATIENT
Start: 2022-10-17 | End: 2022-10-31

## 2022-10-17 ASSESSMENT — ENCOUNTER SYMPTOMS
CHILLS: 0
SHORTNESS OF BREATH: 0
FEVER: 1
FLANK PAIN: 1
COUGH: 0

## 2022-10-17 ASSESSMENT — ACTIVITIES OF DAILY LIVING (ADL): ADLS_ACUITY_SCORE: 33

## 2022-10-17 NOTE — ED PROVIDER NOTES
"    [facility] EMERGENCY DEPARTMENT ([facility])     October 17, 2022      History     Chief Complaint   Patient presents with     Rash     Onset 8 days ago with shingles, rash on left rib area, left flank pain, continues with pain, itching and burning.     HPI  Bev Strong is a 32 year old female with a past medical history significant for anxiety  who presents to the Emergency Department for evaluation of rash. Patient states she had an onset 8 days ago with shingles. Patient states the rashes are burning and itching. Patient reports pain when she takes a deep breath. Patient states she was prescribed valacyclovir at her last ED visit but no relief. Patient states that she has a 3 month old baby and stopped breast-feeding since the rash started. Patient endorses subjective fever last night.   Per chart review, patient present to the ED, 10/15 for rashes. Patient reported that she has an itching rash to her left back extending around her left chest to under her breast for the last 1 week. She was diagnosed with herpes zoster without complication. She was prescribed valacyclovir and ibuprofen.       Past Medical History  Past Medical History:   Diagnosis Date     Depressive disorder     anxiety     Tuberculosis     as a child, took medication, cannot have PPD     Uncomplicated asthma      Past Surgical History:   Procedure Laterality Date     DILATION AND CURETTAGE  03/2020     ND INFIBULATION TAKEDOWN  01/2020    done in [facility]     US HYSTEROSONOGRAPHY  08/2020      HYSTEROSONOGRAPHY  10/2020    \"flushed tubes\" in [facility]     HYDROcodone-acetaminophen (NORCO) 5-325 MG tablet  ibuprofen (ADVIL/MOTRIN) 600 MG tablet  predniSONE (DELTASONE) 10 MG tablet  Acetaminophen (TYLENOL PO)  albuterol (PROAIR HFA/PROVENTIL HFA/VENTOLIN HFA) 108 (90 Base) MCG/ACT inhaler  albuterol (PROAIR HFA/PROVENTIL HFA/VENTOLIN HFA) 108 (90 Base) MCG/ACT inhaler  aspirin (ASA) 81 MG chewable tablet  B Complex-C-Biotin-D-Zinc-FA " (VITAL-D RX PO)  FOLIC ACID PO  metFORMIN (GLUCOPHAGE) 500 MG tablet  Prenatal Vit-Fe Fumarate-FA (PRENATAL VITAMIN PLUS LOW IRON) 27-1 MG TABS  vitamin B complex with vitamin C (VITAMIN  B COMPLEX) tablet      Allergies   Allergen Reactions     Contrast Dye      Family History  Family History   Problem Relation Age of Onset     No Known Problems Mother      Hypertension Father      No Known Problems Sister      No Known Problems Brother      No Known Problems Maternal Grandmother      Hypertension Maternal Grandfather      No Known Problems Paternal Grandmother      No Known Problems Other      Social History   Social History     Tobacco Use     Smoking status: Never     Smokeless tobacco: Never   Substance Use Topics     Alcohol use: Never     Drug use: No      Past medical history, past surgical history, medications, allergies, family history, and social history were reviewed with the patient. No additional pertinent items.       Review of Systems   Constitutional: Positive for fever (subjective; no documented temperature). Negative for chills.   Respiratory: Negative for cough and shortness of breath.    Genitourinary: Positive for flank pain. Negative for decreased urine volume.   Skin: Positive for rash.     A complete review of systems was performed with pertinent positives and negatives noted in the HPI, and all other systems negative.                                                                                                                                                                                                                                                                                                                  Physical Exam   BP: 106/73  Pulse: 84  Temp: 98.3  F (36.8  C)  Resp: 16  SpO2: 97 %  Physical Exam  Constitutional:       Appearance: She is well-developed and well-nourished.   HENT:      Head: Normocephalic and atraumatic.   Cardiovascular:      Rate and Rhythm: Normal rate  and regular rhythm.      Pulses: Normal pulses.      Heart sounds: Normal heart sounds.   Pulmonary:      Effort: Pulmonary effort is normal. No respiratory distress.      Breath sounds: Normal breath sounds.   Abdominal:      General: There is no distension.      Palpations: Abdomen is soft.      Tenderness: There is no abdominal tenderness. There is no rebound.   Musculoskeletal:         General: No tenderness.      Cervical back: Normal range of motion.   Skin:     General: Skin is warm and dry.      Comments: Vesicular rash on the left lateral back.  Erythematous base with multiple vesicles coalesced together.  Patient with similar vesicular type pattern under the left breast.   Neurological:      Mental Status: She is alert and oriented to person, place, and time.   Psychiatric:         Mood and Affect: Mood and affect normal.         Behavior: Behavior normal.         Thought Content: Thought content normal.         ED Course      Procedures        4:12 PM  The patient was seen and examined by Imani Buck MD in Room ED20.   The medical record was reviewed and interpreted.                  No results found for any visits on 10/17/22.  Medications - No data to display     Assessments & Plan (with Medical Decision Making)   Patient is a 32-year-old female who presents to the ER due to ongoing pain from her shingles that was diagnosed 2 days prior.  Patient's been having a rash for the past 8 days.  Patient started on Valtrex 2 days ago.  Patient presented to the ER today because she feels like the Valtrex is not working to take away the rash.  Patient does appear to have some new vesicles under her left breast since she says are new.  Patient's main complaint is that she has a burning pain that she feels like is coming from the inside.  She says it feels like some he poured acid on her body.  These symptoms seem consistent with significant nerve pain from the shingles.  Plan will be to discharge her  home with a course of prednisone, gabapentin and some pain medications.  Patient does not have any signs of disseminated herpes zoster at this time.  Recommend the patient follow-up with dermatology for further care.  Patient is well-appearing and vital signs are stable.  Patient's main concern is also that she is worried that she is in a spread to zoster to her child.  Patient was instructed on keeping the lesions covered in preventing any type of exposure.  Had a long discussion with the patient regarding this plan of care.  Patient is stable for discharge    I have reviewed the nursing notes. I have reviewed the findings, diagnosis, plan and need for follow up with the patient.    New Prescriptions    No medications on file       Final diagnoses:   Herpes zoster without complication   HZV (herpes zoster virus) post herpetic neuralgia   I, Ella Tineo, am serving as a trained medical scribe to document services personally performed by Imani Buck MD, based on the provider's statements to me.      IImani MD, was physically present and have reviewed and verified the accuracy of this note documented by Ella Tineo.     --  Imani Buck MD  [facility] EMERGENCY DEPARTMENT  10/17/2022     Imani Buck MD  10/17/22 [id number]

## 2022-10-17 NOTE — DISCHARGE INSTRUCTIONS
You have shingles that is causing your pain.     Continue taking the valtrex as prescribed 2 days prior.     Take the pain medications as the steroids as prescribed.     Please make an appointment to follow up with Dermatology Clinic (phone: [phone]) in 3-5 days even if entirely better.

## 2023-10-24 ENCOUNTER — APPOINTMENT (OUTPATIENT)
Dept: CT IMAGING | Facility: CLINIC | Age: 33
End: 2023-10-24
Attending: EMERGENCY MEDICINE
Payer: COMMERCIAL

## 2023-10-24 ENCOUNTER — HOSPITAL ENCOUNTER (EMERGENCY)
Facility: CLINIC | Age: 33
Discharge: HOME | End: 2023-10-24
Attending: EMERGENCY MEDICINE | Admitting: EMERGENCY MEDICINE
Payer: COMMERCIAL

## 2023-10-24 VITALS
OXYGEN SATURATION: 99 % | RESPIRATION RATE: 18 BRPM | HEART RATE: 77 BPM | DIASTOLIC BLOOD PRESSURE: 77 MMHG | SYSTOLIC BLOOD PRESSURE: 104 MMHG | TEMPERATURE: 98 F

## 2023-10-24 DIAGNOSIS — S80.12XA CONTUSION OF LEFT LOWER LEG, INITIAL ENCOUNTER: ICD-10-CM
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
## 2023-10-24 DIAGNOSIS — S40.022A ARM CONTUSION, LEFT, INITIAL ENCOUNTER: ICD-10-CM

## 2023-10-24 DIAGNOSIS — S30.1XXA CONTUSION OF ABDOMINAL WALL, INITIAL ENCOUNTER: ICD-10-CM

## 2023-10-24 DIAGNOSIS — S20.219A CONTUSION OF CHEST WALL, UNSPECIFIED LATERALITY, INITIAL ENCOUNTER: ICD-10-CM

## 2023-10-24 LAB
ALBUMIN SERPL BCG-MCNC: 4.2 G/DL (ref 3.5–5.2)
ALP SERPL-CCNC: 114 U/L (ref 35–104)
ALT SERPL W P-5'-P-CCNC: 43 U/L (ref 0–50)
ANION GAP SERPL CALCULATED.3IONS-SCNC: 12 MMOL/L (ref 7–15)
AST SERPL W P-5'-P-CCNC: 33 U/L (ref 0–45)
BASOPHILS # BLD AUTO: 0.1 10E3/UL (ref 0–0.2)
BASOPHILS NFR BLD AUTO: 1 %
BILIRUB SERPL-MCNC: 0.3 MG/DL
BUN SERPL-MCNC: 7.3 MG/DL (ref 6–20)
CALCIUM SERPL-MCNC: 9.2 MG/DL (ref 8.6–10)
CHLORIDE SERPL-SCNC: 102 MMOL/L (ref 98–107)
CREAT SERPL-MCNC: 0.64 MG/DL (ref 0.51–0.95)
DEPRECATED HCO3 PLAS-SCNC: 25 MMOL/L (ref 22–29)
EGFRCR SERPLBLD CKD-EPI 2021: >90 ML/MIN/1.73M2
EOSINOPHIL # BLD AUTO: 0.2 10E3/UL (ref 0–0.7)
EOSINOPHIL NFR BLD AUTO: 2 %
ERYTHROCYTE [DISTWIDTH] IN BLOOD BY AUTOMATED COUNT: 13.2 % (ref 10–15)
GLUCOSE SERPL-MCNC: 105 MG/DL (ref 70–99)
HCG SERPL QL: NEGATIVE
HCT VFR BLD AUTO: 39.7 % (ref 35–47)
HGB BLD-MCNC: 13 G/DL (ref 11.7–15.7)
IMM GRANULOCYTES # BLD: 0 10E3/UL
IMM GRANULOCYTES NFR BLD: 0 %
LYMPHOCYTES # BLD AUTO: 2.9 10E3/UL (ref 0.8–5.3)
LYMPHOCYTES NFR BLD AUTO: 30 %
MCH RBC QN AUTO: 29.7 PG (ref 26.5–33)
MCHC RBC AUTO-ENTMCNC: 32.7 G/DL (ref 31.5–36.5)
MCV RBC AUTO: 91 FL (ref 78–100)
MONOCYTES # BLD AUTO: 0.9 10E3/UL (ref 0–1.3)
MONOCYTES NFR BLD AUTO: 9 %
NEUTROPHILS # BLD AUTO: 5.7 10E3/UL (ref 1.6–8.3)
NEUTROPHILS NFR BLD AUTO: 58 %
NRBC # BLD AUTO: 0 10E3/UL
NRBC BLD AUTO-RTO: 0 /100
PLATELET # BLD AUTO: 305 10E3/UL (ref 150–450)
POTASSIUM SERPL-SCNC: 3.4 MMOL/L (ref 3.4–5.3)
PROT SERPL-MCNC: 7.1 G/DL (ref 6.4–8.3)
RBC # BLD AUTO: 4.37 10E6/UL (ref 3.8–5.2)
SODIUM SERPL-SCNC: 139 MMOL/L (ref 135–145)
WBC # BLD AUTO: 9.8 10E3/UL (ref 4–11)

## 2023-10-24 PROCEDURE — 96375 TX/PRO/DX INJ NEW DRUG ADDON: CPT

## 2023-10-24 PROCEDURE — 80053 COMPREHEN METABOLIC PANEL: CPT | Performed by: EMERGENCY MEDICINE

## 2023-10-24 PROCEDURE — 250N000011 HC RX IP 250 OP 636: Performed by: EMERGENCY MEDICINE

## 2023-10-24 PROCEDURE — 250N000011 HC RX IP 250 OP 636: Mod: JZ | Performed by: EMERGENCY MEDICINE

## 2023-10-24 PROCEDURE — 36415 COLL VENOUS BLD VENIPUNCTURE: CPT | Performed by: EMERGENCY MEDICINE

## 2023-10-24 PROCEDURE — 250N000009 HC RX 250: Performed by: EMERGENCY MEDICINE

## 2023-10-24 PROCEDURE — 84703 CHORIONIC GONADOTROPIN ASSAY: CPT | Performed by: EMERGENCY MEDICINE

## 2023-10-24 PROCEDURE — 85025 COMPLETE CBC W/AUTO DIFF WBC: CPT | Performed by: EMERGENCY MEDICINE

## 2023-10-24 PROCEDURE — 99285 EMERGENCY DEPT VISIT HI MDM: CPT | Mod: 25

## 2023-10-24 PROCEDURE — 96374 THER/PROPH/DIAG INJ IV PUSH: CPT | Mod: 59

## 2023-10-24 PROCEDURE — 250N000013 HC RX MED GY IP 250 OP 250 PS 637: Performed by: EMERGENCY MEDICINE

## 2023-10-24 PROCEDURE — 74177 CT ABD & PELVIS W/CONTRAST: CPT

## 2023-10-24 RX ORDER — ACETAMINOPHEN 500 MG
1000 TABLET ORAL ONCE
Status: COMPLETED | OUTPATIENT
Start: 2023-10-24 | End: 2023-10-24

## 2023-10-24 RX ORDER — CYCLOBENZAPRINE HCL 10 MG
5-10 TABLET ORAL 3 TIMES DAILY PRN
Qty: 20 TABLET | Refills: 0 | Status: SHIPPED | OUTPATIENT
Start: 2023-10-24

## 2023-10-24 RX ORDER — IBUPROFEN 600 MG/1
600 TABLET, FILM COATED ORAL EVERY 6 HOURS PRN
Qty: 30 TABLET | Refills: 0 | Status: SHIPPED | OUTPATIENT
Start: 2023-10-24 | End: 2023-11-23

## 2023-10-24 RX ORDER — KETOROLAC TROMETHAMINE 15 MG/ML
15 INJECTION, SOLUTION INTRAMUSCULAR; INTRAVENOUS ONCE
Status: COMPLETED | OUTPATIENT
Start: 2023-10-24 | End: 2023-10-24

## 2023-10-24 RX ORDER — LORAZEPAM 2 MG/ML
0.5 INJECTION INTRAMUSCULAR ONCE
Status: COMPLETED | OUTPATIENT
Start: 2023-10-24 | End: 2023-10-24

## 2023-10-24 RX ORDER — IOPAMIDOL 755 MG/ML
500 INJECTION, SOLUTION INTRAVASCULAR ONCE
Status: COMPLETED | OUTPATIENT
Start: 2023-10-24 | End: 2023-10-24

## 2023-10-24 RX ADMIN — SODIUM CHLORIDE 57 ML: 9 INJECTION, SOLUTION INTRAVENOUS at 19:39

## 2023-10-24 RX ADMIN — KETOROLAC TROMETHAMINE 15 MG: 15 INJECTION, SOLUTION INTRAMUSCULAR; INTRAVENOUS at 19:11

## 2023-10-24 RX ADMIN — ACETAMINOPHEN 1000 MG: 500 TABLET, FILM COATED ORAL at 19:00

## 2023-10-24 RX ADMIN — IOPAMIDOL 68 ML: 755 INJECTION, SOLUTION INTRAVENOUS at 19:39

## 2023-10-24 RX ADMIN — LORAZEPAM 0.5 MG: 2 INJECTION INTRAMUSCULAR; INTRAVENOUS at 19:01

## 2023-10-24 ASSESSMENT — ACTIVITIES OF DAILY LIVING (ADL)
ADLS_ACUITY_SCORE: 35
ADLS_ACUITY_SCORE: 35

## 2023-10-24 NOTE — ED NOTES
Walked from triage without issues. Speaking on the phone, rapidly and loudly while staff rooming her.
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
## 2023-10-24 NOTE — ED NOTES
"Presented to nursing station multiple times, demanding to know when CT would be completed. Demanding phone changer. Writer was mid-task while patient begin demanding \"to see the nurse or doctor!\" Attempted to ask patient to wait a moment without success.   Patient increasingly upset about wait time. Threatening to leave AMA if staff did not locate a phone changer for her.   "

## (undated) RX ORDER — KETOROLAC TROMETHAMINE 15 MG/ML
INJECTION, SOLUTION INTRAMUSCULAR; INTRAVENOUS
Status: DISPENSED
Start: 2023-10-24